# Patient Record
Sex: MALE | Race: WHITE | NOT HISPANIC OR LATINO | Employment: OTHER
[De-identification: names, ages, dates, MRNs, and addresses within clinical notes are randomized per-mention and may not be internally consistent; named-entity substitution may affect disease eponyms.]

---

## 2018-04-27 ENCOUNTER — TRANSCRIBE ORDERS (OUTPATIENT)
Dept: SCHEDULING | Age: 68
End: 2018-04-27

## 2018-04-27 DIAGNOSIS — M81.0 SENILE OSTEOPOROSIS: Primary | ICD-10-CM

## 2018-05-02 ENCOUNTER — HOSPITAL ENCOUNTER (OUTPATIENT)
Dept: RADIOLOGY | Age: 68
Discharge: HOME | End: 2018-05-02
Attending: INTERNAL MEDICINE
Payer: MEDICARE

## 2018-05-02 DIAGNOSIS — M81.0 SENILE OSTEOPOROSIS: ICD-10-CM

## 2018-05-02 PROCEDURE — 77080 DXA BONE DENSITY AXIAL: CPT

## 2020-09-23 PROBLEM — Z98.42 CATARACT EXTRACTION STATUS: Noted: 2020-09-10

## 2020-09-23 PROBLEM — Z98.41 CATARACT EXTRACTION STATUS: Noted: 2020-09-22

## 2020-09-23 PROBLEM — H52.4 PRESBYOPIA: Noted: 2021-02-08

## 2020-09-23 PROBLEM — H35.342 MACULAR HOLE: Noted: 2020-12-28

## 2020-09-23 PROBLEM — Z98.41 S/P CATARACT SURGERY: Noted: 2020-09-23

## 2020-09-23 PROBLEM — H26.493 OTHER SECONDARY CATARACT: Noted: 2020-12-28

## 2020-09-23 PROBLEM — H25.813 COMBINED SENILE CATARACT: Noted: 2020-07-23

## 2020-09-23 PROBLEM — Z98.42 S/P CATARACT SURGERY: Noted: 2020-09-23

## 2021-02-08 ENCOUNTER — PREPPED CHART (OUTPATIENT)
Dept: URBAN - METROPOLITAN AREA CLINIC 100 | Facility: CLINIC | Age: 71
End: 2021-02-08

## 2021-02-08 PROBLEM — H52.4 PRESBYOPIA: Noted: 2021-02-08

## 2021-02-08 PROBLEM — H35.342 MACULAR HOLE: Noted: 2020-12-28

## 2021-03-16 ENCOUNTER — OFFICE VISIT (OUTPATIENT)
Dept: ORTHOPEDICS | Facility: CLINIC | Age: 71
End: 2021-03-16
Payer: MEDICARE

## 2021-03-16 VITALS — BODY MASS INDEX: 20.72 KG/M2 | WEIGHT: 132 LBS | HEIGHT: 67 IN

## 2021-03-16 DIAGNOSIS — M48.061 LUMBAR STENOSIS WITHOUT NEUROGENIC CLAUDICATION: ICD-10-CM

## 2021-03-16 DIAGNOSIS — M47.816 LUMBAR FACET ARTHROPATHY: ICD-10-CM

## 2021-03-16 DIAGNOSIS — M51.369 LUMBAR DEGENERATIVE DISC DISEASE: ICD-10-CM

## 2021-03-16 DIAGNOSIS — M41.56 OTHER SECONDARY SCOLIOSIS, LUMBAR REGION: ICD-10-CM

## 2021-03-16 DIAGNOSIS — M54.16 LUMBAR RADICULOPATHY: Primary | ICD-10-CM

## 2021-03-16 PROCEDURE — 99204 OFFICE O/P NEW MOD 45 MIN: CPT | Performed by: ORTHOPAEDIC SURGERY

## 2021-03-16 RX ORDER — LAMOTRIGINE 100 MG/1
200 TABLET ORAL DAILY
COMMUNITY
Start: 2015-09-11

## 2021-03-16 RX ORDER — TAMSULOSIN HYDROCHLORIDE 0.4 MG/1
0.4 CAPSULE ORAL DAILY
COMMUNITY

## 2021-03-16 RX ORDER — AMOXICILLIN 500 MG/1
500 CAPSULE ORAL
COMMUNITY
End: 2021-04-29 | Stop reason: ENTERED-IN-ERROR

## 2021-03-16 RX ORDER — FINASTERIDE 5 MG/1
5 TABLET, FILM COATED ORAL DAILY
COMMUNITY

## 2021-03-16 RX ORDER — GABAPENTIN 100 MG/1
300 CAPSULE ORAL 2 TIMES DAILY
COMMUNITY
Start: 2021-02-09

## 2021-03-16 NOTE — PROGRESS NOTES
"ORTHOSPINE H&P  Admitting Diagnosis:  No admission diagnoses are documented for this encounter.      CHIEF COMPLAINT : 1.  Right buttock pain  2.  Right thigh pain  3.  Right lateral calf pain  4.  Right leg numbness  5.  Declining walk tolerance    HPI :     Patient is a 71 y.o. male who presents with 3 months history progressive symptoms as noted above.    Physical therapy no relief 2 injections provided no relief.    No surgery to date.    Symptoms reportedly sharp burning shooting aching throbbing sickening stabbing and punishing in nature.    Pain is aggravated by coughing sneezing straining bending forward and backward.    Notes improvement when he sits.    1 block walk tolerance.    Pain is worse with standing sitting and walking all capacities.    All treatments have provided no benefit except medications.  Treatment options have included chiropractor anti-inflammatories injections physical therapy..     Medical History: No past medical history on file.    Surgical History: No past surgical history on file.    Social History:   Social History     Social History Narrative   • Not on file       Family History: No family history on file.    Allergies: Gluten       Medication List          Accurate as of March 16, 2021 10:32 AM. If you have any questions, ask your nurse or doctor.            CONTINUE taking these medications    amoxicillin 500 mg capsule  Commonly known as: AMOXIL  500 mg.  Dose: 500 mg     finasteride 5 mg tablet  Commonly known as: PROSCAR  5 mg.  Dose: 5 mg     gabapentin 100 mg capsule  Commonly known as: NEURONTIN  Take 100 mg by mouth nightly.  Dose: 100 mg     lamoTRIgine 100 mg tablet  Commonly known as: LaMICtal  100 mg.  Dose: 100 mg     tamsulosin 0.4 mg capsule  Commonly known as: FLOMAX  0.4 mg.  Dose: 0.4 mg          Review of Systems  All other systems reviewed and negative except as noted in the HPI.    Objective       Physical Exam :   Visit Vitals  Ht 1.702 m (5' 7\")   Wt 59.9 " kg (132 lb)   BMI 20.67 kg/m²     Alert and oriented to person time and place.  Range of motion restricted.  Mild coronal deformity.  Motor testing lower extremities 5/5 bilaterally.  Provocative radicular testing absent      Imaging : Personal view MRI scan lumbar spine notes a thoracolumbar scoliosis multilevel lumbar disc and facet disease with congenital stenosis producing moderate to severe foraminal stenosis moderate central canal stenosis.  No instability is noted.  No critical stenosis.    MRI report reviewed      IMPRESSION : 1.  Lumbar scoliosis  2.  Multilevel lumbar congenital acquired spinal stenosis with right lower extremity radiculopathy  3.  Congenital acquired lumbar spinal stenosis with neurogenic claudication  4.  Multilevel lumbar facet arthropathy    PLAN : Treatment options were reviewed including the role of decompressive laminectomy surgery likely from L2-L5.    I discussed the indications outcomes risks and benefits.    However, before we embark on definitive surgical recommendations or considerations I did advise further imaging which would include scoliosis x-rays, as well as a CT scan of his lumbar spine.    I am hopeful to advise only a decompressive laminectomy surgery with the indications risks benefits outcomes already reviewed more so than the stabilization surgery at this point in particular noting his unilateral predominant leg pain complaints.        Benjamin Reyes MD  3/16/2021  10:32 AM

## 2021-03-17 ENCOUNTER — HOSPITAL ENCOUNTER (OUTPATIENT)
Dept: RADIOLOGY | Facility: HOSPITAL | Age: 71
Discharge: HOME | End: 2021-03-17
Attending: ORTHOPAEDIC SURGERY
Payer: MEDICARE

## 2021-03-17 DIAGNOSIS — M48.061 LUMBAR STENOSIS WITHOUT NEUROGENIC CLAUDICATION: ICD-10-CM

## 2021-03-17 DIAGNOSIS — M54.16 LUMBAR RADICULOPATHY: ICD-10-CM

## 2021-03-17 DIAGNOSIS — M51.369 LUMBAR DEGENERATIVE DISC DISEASE: ICD-10-CM

## 2021-03-17 DIAGNOSIS — M47.816 LUMBAR FACET ARTHROPATHY: ICD-10-CM

## 2021-03-17 DIAGNOSIS — M41.56 OTHER SECONDARY SCOLIOSIS, LUMBAR REGION: ICD-10-CM

## 2021-03-17 PROCEDURE — G1004 CDSM NDSC: HCPCS

## 2021-03-17 PROCEDURE — 72082 X-RAY EXAM ENTIRE SPI 2/3 VW: CPT

## 2021-03-23 ENCOUNTER — OFFICE VISIT (OUTPATIENT)
Dept: ORTHOPEDICS | Facility: CLINIC | Age: 71
End: 2021-03-23
Payer: MEDICARE

## 2021-03-23 VITALS — BODY MASS INDEX: 20.72 KG/M2 | HEIGHT: 67 IN | WEIGHT: 132 LBS

## 2021-03-23 DIAGNOSIS — M48.061 LUMBAR STENOSIS WITHOUT NEUROGENIC CLAUDICATION: ICD-10-CM

## 2021-03-23 DIAGNOSIS — M41.56 OTHER SECONDARY SCOLIOSIS, LUMBAR REGION: ICD-10-CM

## 2021-03-23 DIAGNOSIS — M54.16 LUMBAR RADICULOPATHY: Primary | ICD-10-CM

## 2021-03-23 DIAGNOSIS — M47.816 LUMBAR FACET ARTHROPATHY: ICD-10-CM

## 2021-03-23 DIAGNOSIS — M51.369 LUMBAR DEGENERATIVE DISC DISEASE: ICD-10-CM

## 2021-03-23 PROCEDURE — 99215 OFFICE O/P EST HI 40 MIN: CPT | Performed by: ORTHOPAEDIC SURGERY

## 2021-03-26 NOTE — PROGRESS NOTES
"ORTHO SPINE ESTABLISHED PATIENT      Otis Mccoy       HPI: Returns noting ongoing lower extremity pain.  Pain is reportedly dermatomal right side predominantly buttock, thigh lateral calf leg numbness and a declining walk tolerance.    At my previous request he did undergo diagnostic studies.    PHYSICAL EXAM :   Visit Vitals  Ht 1.702 m (5' 7\")   Wt 59.9 kg (132 lb)   BMI 20.67 kg/m²       Alert and oriented to person time and place.  Sagittal and coronal deformity is noted with a kyphoscoliotic deformity.  Motor testing lower extremities 5/5 bilaterally.  Provocative radicular testing absent.      Imaging : Personal view scoliosis x-rays outline a kyphoscoliosis without rotatory subluxation in the lower lumbar level.  Lateral projection does note global loss of lumbar lordosis but acceptable alignment.  Thoracic kyphosis and thoracolumbar hyperlordosis are noted.    Personal view MRI scan lumbar spine outlines lumbar spinal stenosis L2-3 L3-4 L4-L5 related to posterior element hypertrophic changes.    MRI x-ray report reviewed    IMPRESSION : 1.  Thoracolumbar kyphoscoliosis  2.  Lumbar spinal stenosis multilevel with right lower extremity radiculopathy  3.  Multilevel lumbar facet arthropathy  4.  Diffuse osteoporosis/osteopenia    PLAN : The risk of posterior lumbar spine surgery was reviewed, including the topics of neurologic, infectious, medical, as well as the clinical outcomes both favorable and unfavorable as a result of a posteriorly-based lumbar spine surgery.    Specifically, the neurologic risks including nerve injury, dural tear, spinal fluid leakage, pseudomeningocele, arachnoiditis, cauda equina syndrome, hematoma with neurologic consequences, traction neuritis, reflex sympathetic dystrophy, additional lower extremity neurologic causalgias, as well as persistent neurologic symptomatology and/or new symptomatology were all reviewed.  In addition, the unpredictable neurologic imponderables were " also referenced.    Infectious complications including superficial and deep wound infection, spinal meningitis, osteomyelitis, discitis, epidural abscess, and the need definitively for further surgery and/or long-term intravenous antibiotic treatment or wound management or revision were outlined.    Medical complications including cardiopulmonary, cardiovascular, cerebrovascular, gastrointestinal, as well as thromboembolic phenomenon were reviewed.  The possibility of a deep vein thrombosis leading to a pulmonary embolism was outlined as well as the cardiac risks, cerebrovascular risks, pulmonary risks, and other medical unpredictable outcomes related to the use of general anesthesia, stress of surgery, and underlying either diagnosed or undiagnosed medical comorbidities.    The concept of adjacent segment degeneration if applicable as well as the concept of ongoing spinal degeneration requiring further surgery even at the same operative level were outlined.  In general, the possibility of outcomes included either short or long-term failures related to prior or current surgical treatments.    Specifically, the outcomes of surgery were reviewed, including the favorable, unfavorable, and unpredictable outcomes related to neurologic surgical procedures as well as specifically posteriorly-based lumbar spine surgery.  These outcomes included relief of leg pain, relief of low back pain, as well as the possibility of no change or worsening in this patient's preoperative complaints.  In addition, the possibility of a patient developing a clinical syndrome that was not present preoperatively and manifested itself in the postoperative period was reviewed.  The percentage probability of improving presurgical leg pain, low back pain, as well as the additional neuritic complaints of numbness, weakness, and paresthesias were reviewed.    Throughout the course of this presurgical discussion it was emphasized over multiple  conversations that this procedure generally improves predictably radicular or leg pain complaints and generally has an unpredictable outcome result regarding axial pain complaints.    I discussed the clinical treatment options in great detail both surgical and nonsurgical.  We did discuss the role of spinal surgery, particularly revision surgery and the risks associated with this procedure specifically.  Risk of surgery including nerve root injury, dural tear, spinal fluid leakage, pseudomeningocele, and post laminectomy instability were reviewed.  Instrumentation complications including implant failure, fracture, migration, loosening, dislodgment, need for revision, and removal of implants were all reviewed.    The patient was scheduled to have appropriate preadmission testing performed, which would include an H NP, blood work, EKG, and a cardiac evaluation prior to surgery.  In addition, the need for vascular surgeon evaluation depending on prior history of blood clots or pulmonary embolism were reviewed.    This patient is going to require an extended postoperative length of stay greater than 2 midnight stays.  In fact, this patient will require up to a 4 day hospital stay as a result of the need for closed suction drain, need for 48 hours of intravenous antibiotics recommended by the infectious disease specialist as well as the concerns over a possible perioperative neurologic deficit by way of either a hematoma and/or a compressive wound condition.  In addition, this patient is manifesting a number of medical comorbidities including the need for medication monitoring and pain management monitoring as a result of the extensive nature of their spinal surgical procedure.  It is my recommendation that this patient will likely require a greater than 24 hour hospital stay due to the risks to this patient of a perioperative or postoperative complication that would ultimately result in grave circumstances in the  outpatient setting and increase this patient to a higher risk of postoperative morbidity and/or mortality.    The above outlines a educational documentation for the patient via [employer].    I personally discussed the role of a decompressive laminectomy focusing on the posterior L2-L5 level.    Outcomes of surgery were reviewed and risks particularly infection due to immunocompromise state and pathologic instability post laminectomy as a result of osteoporosis which is fairly evident on the plain radiographs.      Benjamin Reyes MD   3/26/2021  7:50 AM

## 2021-04-29 ENCOUNTER — APPOINTMENT (OUTPATIENT)
Dept: LAB | Facility: HOSPITAL | Age: 71
End: 2021-04-29
Attending: ORTHOPAEDIC SURGERY
Payer: MEDICARE

## 2021-04-29 ENCOUNTER — TRANSCRIBE ORDERS (OUTPATIENT)
Dept: ORTHOPEDICS | Facility: CLINIC | Age: 71
End: 2021-04-29

## 2021-04-29 ENCOUNTER — OFFICE VISIT (OUTPATIENT)
Dept: PREADMISSION TESTING | Facility: HOSPITAL | Age: 71
End: 2021-04-29
Attending: ORTHOPAEDIC SURGERY
Payer: MEDICARE

## 2021-04-29 VITALS
HEART RATE: 61 BPM | SYSTOLIC BLOOD PRESSURE: 136 MMHG | HEIGHT: 67 IN | TEMPERATURE: 98.1 F | DIASTOLIC BLOOD PRESSURE: 72 MMHG | WEIGHT: 132.6 LBS | RESPIRATION RATE: 18 BRPM | BODY MASS INDEX: 20.81 KG/M2 | OXYGEN SATURATION: 96 %

## 2021-04-29 DIAGNOSIS — M54.17 RADICULOPATHY, LUMBOSACRAL REGION: ICD-10-CM

## 2021-04-29 DIAGNOSIS — N40.0 BENIGN PROSTATIC HYPERPLASIA, UNSPECIFIED WHETHER LOWER URINARY TRACT SYMPTOMS PRESENT: ICD-10-CM

## 2021-04-29 DIAGNOSIS — M48.061 LUMBAR STENOSIS WITHOUT NEUROGENIC CLAUDICATION: ICD-10-CM

## 2021-04-29 DIAGNOSIS — Z01.818 PREOP EXAMINATION: Primary | ICD-10-CM

## 2021-04-29 DIAGNOSIS — M48.062 SPINAL STENOSIS, LUMBAR REGION WITH NEUROGENIC CLAUDICATION: Primary | ICD-10-CM

## 2021-04-29 DIAGNOSIS — M48.062 SPINAL STENOSIS, LUMBAR REGION WITH NEUROGENIC CLAUDICATION: ICD-10-CM

## 2021-04-29 DIAGNOSIS — F31.9 BIPOLAR AFFECTIVE DISORDER, REMISSION STATUS UNSPECIFIED (CMS/HCC): ICD-10-CM

## 2021-04-29 DIAGNOSIS — K90.0 CELIAC DISEASE: ICD-10-CM

## 2021-04-29 DIAGNOSIS — F17.200 SMOKER: ICD-10-CM

## 2021-04-29 PROBLEM — Z86.018 HISTORY OF ATRIAL MYXOMA: Status: ACTIVE | Noted: 2021-04-29

## 2021-04-29 PROBLEM — K08.9 POOR DENTITION: Status: ACTIVE | Noted: 2021-04-29

## 2021-04-29 LAB
ABO + RH BLD: NORMAL
ANION GAP SERPL CALC-SCNC: 8 MEQ/L (ref 3–15)
APTT PPP: 29 SEC (ref 23–35)
BASOPHILS # BLD: 0.03 K/UL (ref 0.01–0.1)
BASOPHILS NFR BLD: 0.4 %
BLD GP AB SCN SERPL QL: NEGATIVE
BLOOD BANK CMNT PATIENT-IMP: NORMAL
BUN SERPL-MCNC: 19 MG/DL (ref 8–20)
CALCIUM SERPL-MCNC: 8.8 MG/DL (ref 8.9–10.3)
CHLORIDE SERPL-SCNC: 107 MEQ/L (ref 98–109)
CO2 SERPL-SCNC: 24 MEQ/L (ref 22–32)
CREAT SERPL-MCNC: 1 MG/DL (ref 0.8–1.3)
D AG BLD QL: POSITIVE
DIFFERENTIAL METHOD BLD: ABNORMAL
EOSINOPHIL # BLD: 0.12 K/UL (ref 0.04–0.54)
EOSINOPHIL NFR BLD: 1.4 %
ERYTHROCYTE [DISTWIDTH] IN BLOOD BY AUTOMATED COUNT: 15.4 % (ref 11.6–14.4)
EST. AVERAGE GLUCOSE BLD GHB EST-MCNC: 114 MG/DL
GFR SERPL CREATININE-BSD FRML MDRD: >60 ML/MIN/1.73M*2
GLUCOSE SERPL-MCNC: 82 MG/DL (ref 70–99)
HBA1C MFR BLD HPLC: 5.6 %
HCT VFR BLDCO AUTO: 40.4 % (ref 40.1–51)
HGB BLD-MCNC: 12.6 G/DL (ref 13.7–17.5)
IMM GRANULOCYTES # BLD AUTO: 0.02 K/UL (ref 0–0.08)
IMM GRANULOCYTES NFR BLD AUTO: 0.2 %
INR PPP: 1
LABORATORY COMMENT REPORT: NORMAL
LYMPHOCYTES # BLD: 1.49 K/UL (ref 1.2–3.5)
LYMPHOCYTES NFR BLD: 17.8 %
MCH RBC QN AUTO: 27.5 PG (ref 28–33.2)
MCHC RBC AUTO-ENTMCNC: 31.2 G/DL (ref 32.2–36.5)
MCV RBC AUTO: 88.2 FL (ref 83–98)
MONOCYTES # BLD: 0.52 K/UL (ref 0.3–1)
MONOCYTES NFR BLD: 6.2 %
NEUTROPHILS # BLD: 6.19 K/UL (ref 1.7–7)
NEUTS SEG NFR BLD: 74 %
NRBC BLD-RTO: 0 %
PDW BLD AUTO: 11.8 FL (ref 9.4–12.4)
PLATELET # BLD AUTO: 242 K/UL (ref 150–350)
POTASSIUM SERPL-SCNC: 4.4 MEQ/L (ref 3.6–5.1)
PROTHROMBIN TIME: 12.8 SEC (ref 12.2–14.5)
RBC # BLD AUTO: 4.58 M/UL (ref 4.5–5.8)
SODIUM SERPL-SCNC: 139 MEQ/L (ref 136–144)
WBC # BLD AUTO: 8.37 K/UL (ref 3.8–10.5)

## 2021-04-29 PROCEDURE — 86900 BLOOD TYPING SEROLOGIC ABO: CPT

## 2021-04-29 PROCEDURE — 93005 ELECTROCARDIOGRAM TRACING: CPT | Performed by: INTERNAL MEDICINE

## 2021-04-29 PROCEDURE — 99204 OFFICE O/P NEW MOD 45 MIN: CPT | Performed by: INTERNAL MEDICINE

## 2021-04-29 PROCEDURE — 83036 HEMOGLOBIN GLYCOSYLATED A1C: CPT | Mod: GA

## 2021-04-29 PROCEDURE — 36415 COLL VENOUS BLD VENIPUNCTURE: CPT

## 2021-04-29 PROCEDURE — 85610 PROTHROMBIN TIME: CPT

## 2021-04-29 PROCEDURE — 80048 BASIC METABOLIC PNL TOTAL CA: CPT

## 2021-04-29 PROCEDURE — 85730 THROMBOPLASTIN TIME PARTIAL: CPT | Mod: GA

## 2021-04-29 PROCEDURE — 85025 COMPLETE CBC W/AUTO DIFF WBC: CPT

## 2021-04-29 RX ORDER — IBUPROFEN 200 MG
800 TABLET ORAL EVERY 6 HOURS PRN
COMMUNITY
End: 2021-05-10 | Stop reason: SDUPTHER

## 2021-04-29 RX ORDER — DULOXETIN HYDROCHLORIDE 60 MG/1
60 CAPSULE, DELAYED RELEASE ORAL NIGHTLY
COMMUNITY

## 2021-04-29 ASSESSMENT — PAIN SCALES - GENERAL: PAINLEVEL: 6

## 2021-04-29 NOTE — H&P (VIEW-ONLY)
[facility] Medicine Service -  Pre-Operative Consultation       Patient Name: Otis Mccoy  Referring Surgeon: Benjamin Reyes    Reason for Referral: Pre-Operative Evaluation  Surgical Procedure: lumbar laminectomy L2-S1  Operative Date: 5/10/21  Other Providers:      PCP: Leticia Cloud DO     Cardiology: Marshall Galdamez  Psychiatry: thru [place]     HISTORY OF PRESENT ILLNESS      Otis Mccoy is a 71 y.o. male presenting today to the [facility] at [facility] for pre-operative evaluation prior to planned surgery.    This patient has had at least 3-4 months of progressive symptoms related to his lumbar spine. He has severe deep right leg pain, to his toes, worse with ambulation, along with right lower back pain. His leg bothers him moreso than his back. He has numbness to his right foot. His right calf hurts severely at times particularly when walking. He has no left-sided symptoms. He has had no relief with prior attempts at management.     In regards to medical history:  · He has BPH, medically managed.  · He has bipolar disorder, medically managed.  · He has a history of Juanita-Genao tear, received blood transfusions years ago for this.  · He has celiac disease.  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 Moreno but he reports he hasn't seen cardiology in some time.   · He is a smoker.  · He reports he received both COVID vaccines (second shot in early April per patient).     The patient denies any current or recent chest pain or pressure, dyspnea, cough, sputum, fevers, chills, abdominal pain, nausea, vomiting, diarrhea or other symptoms.     Functionally, the patient is able to ascend a flight or so of stairs with no dyspnea or chest pain.     The patient denies, on specific questioning, the following:  No history of MI, arrhythmia,or CHF.  No history of ESTRELLA. STOP-Bang  "Total Score: 2  No history of DVT/PE.  No history of COPD.  No history of CVA.  No history of DM.   No history of CKD.     PAST MEDICAL AND SURGICAL HISTORY      Past Medical History:   Diagnosis Date   • Anxiety     hx of   • Arthritis     bottom of spine   • Celiac disease     hx of   • Depression    • ED (erectile dysfunction)     hx of   • Fatigue     hx of   • Numbness     hx of       Past Surgical History:   Procedure Laterality Date   • CARDIAC SURGERY     • CATARACT EXTRACTION, BILATERAL      hx of   • KNEE ARTHROPLASTY      left knee       MEDICATIONS        Current Outpatient Medications:   •  DULoxetine (CYMBALTA) 60 mg capsule, Take 60 mg by mouth nightly., Disp: , Rfl:   •  ibuprofen (MOTRIN) 200 mg tablet, Take 800 mg by mouth every 6 (six) hours as needed for mild pain., Disp: , Rfl:   •  finasteride (PROSCAR) 5 mg tablet, Take 5 mg by mouth daily.  , Disp: , Rfl:   •  gabapentin (NEURONTIN) 100 mg capsule, Take 300 mg by mouth 2 (two) times a day.  , Disp: , Rfl:   •  lamoTRIgine (LaMICtal) 100 mg tablet, Take 200 mg by mouth daily.  , Disp: , Rfl:   •  tamsulosin (FLOMAX) 0.4 mg capsule, Take 0.4 mg by mouth daily.  , Disp: , Rfl:     ALLERGIES      Gluten    FAMILY HISTORY      family history includes Brain Aneurysm in his biological mother; No Known Problems in his biological father and biological sister.    Denies any prior known family history of DVTs/PEs/clotting disorder    SOCIAL HISTORY      Social History     Tobacco Use   • Smoking status: Current Every Day Smoker     Packs/day: 1.00     Years: 50.00     Pack years: 50.00     Types: Cigarettes   • Smokeless tobacco: Never Used   Vaping Use   • Vaping Use: Never used   Substance Use Topics   • Alcohol use: Yes     Comment: 6driinks a week   • Drug use: Never     has been \"semi-retired\" since the pandemic; he has worked as a     REVIEW OF SYSTEMS      All other systems reviewed and negative except as noted in " "HPI    PHYSICAL EXAMINATION      Visit Vitals  /72 (BP Location: Right upper arm, Patient Position: Sitting)   Pulse 61   Temp 36.7 °C (98.1 °F) (Temporal)   Resp 18   Ht 1.702 m (5' 7\")   Wt 60.1 kg (132 lb 9.6 oz)   SpO2 96%   BMI 20.77 kg/m²     Body mass index is 20.77 kg/m².  GEN: well-developed and well-nourished; not in acute distress  HEENT: normocephalic; atraumatic  NECK: no JVD; no bruits  CARDIO: regular rate and rhythm; no murmurs or rubs  RESP: clear to auscultation bilaterally; no rales, rhonchi, or wheezes  ABD: soft, non-distended, non-tender, normal bowel sounds  EXT: no cyanosis, clubbing, or edema  SKIN: clean, dry, warm, and intact  MUSCULOSKELETAL: no injury or deformity  NEURO: alert and oriented x 3; 5-/5 right hip flexor otherwise 5/5 throughout  BEHAVIOR/EMOTIONAL: appropriate; cooperative    LABS / EKG        Labs  Lab Results   Component Value Date     04/29/2021    K 4.4 04/29/2021     04/29/2021    BUN 19 04/29/2021    CREATININE 1.0 04/29/2021    WBC 8.37 04/29/2021    HGB 12.6 (L) 04/29/2021    HCT 40.4 04/29/2021     04/29/2021    ALT 28 08/04/2015    AST 25 08/04/2015    INR 1.0 04/29/2021    HGBA1C 5.6 04/29/2021       ECG/Telemetry  NSR; RBBB; LAFB    ASSESSMENT AND PLAN         Preop examination  Medical management and jose-operative risk commentary noted as per this document's contents.    Lumbar stenosis without neurogenic claudication  Surgery as scheduled.    History of atrial myxoma  s/p resection in 2015 (Dr. Luna). Noted for the record. He cannot recall details of how this was picked up. He is seeing Dr. Galdamez on 5/6/21.     BPH (benign prostatic hyperplasia)  Continue finasteride and tamsulosin. Due to his pre-existing condition of BPH, the patient is theoretically at possible increased risk of urinary retention post-operatively.    Bipolar disorder (CMS/HCC)  Continue current medical regimen.    Celiac disease  Ensure gluten free diet. " Outpatient follow-up.    Smoker  Smoking cessation counseling performed.  Offer nicotine patch while in-house.    Poor dentition  Noted for the record. He should be following with dentistry. Denies tooth pain today.        In regards to perioperative cardiac risk:  Needs to see Dr. Galdamez.    Further comments:  Resume supplements when OK with surgical team.  I would encourage incentive spirometry to assist with minimizing jose-operative pulmonary risk.  DVT prophylaxis and timing of such per the discretion of the surgeon.     Please do not hesitate to contact [facility] during the upcoming hospitalization with any questions or concerns.     Odilon Patel MD  4/30/2021

## 2021-04-29 NOTE — CONSULTS
[facility] Medicine Service -  Pre-Operative Consultation       Patient Name: Otis Mccoy  Referring Surgeon: Benjamin Reyes    Reason for Referral: Pre-Operative Evaluation  Surgical Procedure: lumbar laminectomy L2-S1  Operative Date: 5/10/21  Other Providers:      PCP: Leticia Cloud DO     Cardiology: Marshall Galdamez  Psychiatry: thru [place]     HISTORY OF PRESENT ILLNESS      Otis Mccoy is a 71 y.o. male presenting today to the [facility] at [facility] for pre-operative evaluation prior to planned surgery.    This patient has had at least 3-4 months of progressive symptoms related to his lumbar spine. He has severe deep right leg pain, to his toes, worse with ambulation, along with right lower back pain. His leg bothers him moreso than his back. He has numbness to his right foot. His right calf hurts severely at times particularly when walking. He has no left-sided symptoms. He has had no relief with prior attempts at management.     In regards to medical history:  · He has BPH, medically managed.  · He has bipolar disorder, medically managed.  · He has a history of Juanita-Genao tear, received blood transfusions years ago for this.  · He has celiac disease.  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 Moreno but he reports he hasn't seen cardiology in some time.   · He is a smoker.  · He reports he received both COVID vaccines (second shot in early April per patient).     The patient denies any current or recent chest pain or pressure, dyspnea, cough, sputum, fevers, chills, abdominal pain, nausea, vomiting, diarrhea or other symptoms.     Functionally, the patient is able to ascend a flight or so of stairs with no dyspnea or chest pain.     The patient denies, on specific questioning, the following:  No history of MI, arrhythmia,or CHF.  No history of ESTRELLA. STOP-Bang  "Total Score: 2  No history of DVT/PE.  No history of COPD.  No history of CVA.  No history of DM.   No history of CKD.     PAST MEDICAL AND SURGICAL HISTORY      Past Medical History:   Diagnosis Date   • Anxiety     hx of   • Arthritis     bottom of spine   • Celiac disease     hx of   • Depression    • ED (erectile dysfunction)     hx of   • Fatigue     hx of   • Numbness     hx of       Past Surgical History:   Procedure Laterality Date   • CARDIAC SURGERY     • CATARACT EXTRACTION, BILATERAL      hx of   • KNEE ARTHROPLASTY      left knee       MEDICATIONS        Current Outpatient Medications:   •  DULoxetine (CYMBALTA) 60 mg capsule, Take 60 mg by mouth nightly., Disp: , Rfl:   •  ibuprofen (MOTRIN) 200 mg tablet, Take 800 mg by mouth every 6 (six) hours as needed for mild pain., Disp: , Rfl:   •  finasteride (PROSCAR) 5 mg tablet, Take 5 mg by mouth daily.  , Disp: , Rfl:   •  gabapentin (NEURONTIN) 100 mg capsule, Take 300 mg by mouth 2 (two) times a day.  , Disp: , Rfl:   •  lamoTRIgine (LaMICtal) 100 mg tablet, Take 200 mg by mouth daily.  , Disp: , Rfl:   •  tamsulosin (FLOMAX) 0.4 mg capsule, Take 0.4 mg by mouth daily.  , Disp: , Rfl:     ALLERGIES      Gluten    FAMILY HISTORY      family history includes Brain Aneurysm in his biological mother; No Known Problems in his biological father and biological sister.    Denies any prior known family history of DVTs/PEs/clotting disorder    SOCIAL HISTORY      Social History     Tobacco Use   • Smoking status: Current Every Day Smoker     Packs/day: 1.00     Years: 50.00     Pack years: 50.00     Types: Cigarettes   • Smokeless tobacco: Never Used   Vaping Use   • Vaping Use: Never used   Substance Use Topics   • Alcohol use: Yes     Comment: 6driinks a week   • Drug use: Never     has been \"semi-retired\" since the pandemic; he has worked as a     REVIEW OF SYSTEMS      All other systems reviewed and negative except as noted in " "HPI    PHYSICAL EXAMINATION      Visit Vitals  /72 (BP Location: Right upper arm, Patient Position: Sitting)   Pulse 61   Temp 36.7 °C (98.1 °F) (Temporal)   Resp 18   Ht 1.702 m (5' 7\")   Wt 60.1 kg (132 lb 9.6 oz)   SpO2 96%   BMI 20.77 kg/m²     Body mass index is 20.77 kg/m².  GEN: well-developed and well-nourished; not in acute distress  HEENT: normocephalic; atraumatic  NECK: no JVD; no bruits  CARDIO: regular rate and rhythm; no murmurs or rubs  RESP: clear to auscultation bilaterally; no rales, rhonchi, or wheezes  ABD: soft, non-distended, non-tender, normal bowel sounds  EXT: no cyanosis, clubbing, or edema  SKIN: clean, dry, warm, and intact  MUSCULOSKELETAL: no injury or deformity  NEURO: alert and oriented x 3; 5-/5 right hip flexor otherwise 5/5 throughout  BEHAVIOR/EMOTIONAL: appropriate; cooperative    LABS / EKG        Labs  Lab Results   Component Value Date     04/29/2021    K 4.4 04/29/2021     04/29/2021    BUN 19 04/29/2021    CREATININE 1.0 04/29/2021    WBC 8.37 04/29/2021    HGB 12.6 (L) 04/29/2021    HCT 40.4 04/29/2021     04/29/2021    ALT 28 08/04/2015    AST 25 08/04/2015    INR 1.0 04/29/2021    HGBA1C 5.6 04/29/2021       ECG/Telemetry  NSR; RBBB; LAFB    ASSESSMENT AND PLAN         Preop examination  Medical management and jose-operative risk commentary noted as per this document's contents.    Lumbar stenosis without neurogenic claudication  Surgery as scheduled.    History of atrial myxoma  s/p resection in 2015 (Dr. Luna). Noted for the record. He cannot recall details of how this was picked up. He is seeing Dr. Galdamez on 5/6/21.     BPH (benign prostatic hyperplasia)  Continue finasteride and tamsulosin. Due to his pre-existing condition of BPH, the patient is theoretically at possible increased risk of urinary retention post-operatively.    Bipolar disorder (CMS/HCC)  Continue current medical regimen.    Celiac disease  Ensure gluten free diet. " Outpatient follow-up.    Smoker  Smoking cessation counseling performed.  Offer nicotine patch while in-house.    Poor dentition  Noted for the record. He should be following with dentistry. Denies tooth pain today.        In regards to perioperative cardiac risk:  Needs to see Dr. Galdamez.    Further comments:  Resume supplements when OK with surgical team.  I would encourage incentive spirometry to assist with minimizing jose-operative pulmonary risk.  DVT prophylaxis and timing of such per the discretion of the surgeon.     Please do not hesitate to contact [facility] during the upcoming hospitalization with any questions or concerns.     Odilon Patel MD  4/30/2021

## 2021-04-29 NOTE — ASSESSMENT & PLAN NOTE
Noted for the record. He should be following with dentistry. Denies tooth pain today.    Eggs and rice

## 2021-04-29 NOTE — ASSESSMENT & PLAN NOTE
s/p resection in 2015 (Dr. Luna). Noted for the record. He cannot recall details of how this was picked up. He is seeing Dr. Galdamez on 5/6/21.

## 2021-04-29 NOTE — PRE-PROCEDURE INSTRUCTIONS
1. We will call you between 3 pm and 7 pm on May 7, 2021 to determine that arrival time for your procedure. If you do not hear by 6PM. Please call [phone] for arrival time.    2. Please report to Main Entrance near Parking lot A, walk into main lobby and report to the admission desk on the first floor on the day of your procedure.       3. Please follow the following fasting guidelines: Follow Dr. Reyes's instructions        Nothing to eat (no solid food) after midnight.    Unlimited clear liquids, meaning water or PLAIN black coffee WITHOUT any milk or cream, are permitted up to TWO HOURS prior to arrival at the hospital.     4. Early on the morning of the procedure please take your usual dose of the listed medications with a sip of water: LaMictal   No Aspirin, Advil, Aleve,Motrin,Supplements, Vitamins  For 1 week prior to surgery.    May take Tylenol if needed.   5. Other Instructions: You may brush your teeth the morning of the procedure. Rinse and spit, do not swallow.  Bring a list of your medications with dosages with you.  Use surgical wash as directed. CHG Scrub 2 night before and the night before the procedure and the morning of the procedure.   6. If you develop a cold, cough, fever, rash, or other symptom prior to the data of the procedure, please report it to your physician immediately.   7. If you need to cancel the procedure for any reason, please contact your physician or call the unit listed above.   8. Make arrangements to have someone drive you home from the procedure. If you have not arranged for transportation home, your surgery may be cancelled.    9. You may not take public transportation unless accompanied by a responsible person.   10. You may not drive a car or operate complex or potentially dangerous machinery for 24 hours following anesthesia and/or sedation.   11. If it is medically necessary for you to have a longer stay, you will be informed as soon as the decision is made.    12. Do not wear or bring anything of value to the hospital including jewelry of any kind, money, or wallet. Do not wear make-up or contact lenses. DO bring your glasses and a case. DO NOT BRING MEDICATIONS FROM HOME.   13. No lotion, creams, powders, or oils on skin the morning of procedure    14. Dress in comfortable clothes.   15.  If instructed, please bring a copy of your Advanced Directive (Living Will/Durable Power of ) on the day of your procedure.      Pre operative instructions given as per protocol.  Form explained by: Ivana Payne RN     I have read and understand the above information. I have had sufficient opportunity to ask questions I might have and they have been answered to my satisfaction. I agree to comply with the Patient Responsibilities listed above and have received a copy of this form.

## 2021-04-29 NOTE — ASSESSMENT & PLAN NOTE
Continue finasteride and tamsulosin. Due to his pre-existing condition of BPH, the patient is theoretically at possible increased risk of urinary retention post-operatively.

## 2021-04-30 LAB
ATRIAL RATE: 60
P AXIS: 85
PR INTERVAL: 192
QRS DURATION: 154
QT INTERVAL: 474
QTC CALCULATION(BAZETT): 474
R AXIS: -69
T WAVE AXIS: 65
VENTRICULAR RATE: 60

## 2021-04-30 PROCEDURE — 93010 ELECTROCARDIOGRAM REPORT: CPT | Performed by: INTERNAL MEDICINE

## 2021-04-30 NOTE — UM PHYSICIAN REVIEW NOTE
Inpatient appropriate for this 70 yo male with planned L2-L5 laminectomy with anticipated >2 midnight hospital stay. If hospital stay is <2 midnights, please resubmit post discharge for review.

## 2021-05-05 ENCOUNTER — TRANSCRIBE ORDERS (OUTPATIENT)
Dept: ORTHOPEDICS | Facility: CLINIC | Age: 71
End: 2021-05-05

## 2021-05-05 ENCOUNTER — APPOINTMENT (OUTPATIENT)
Dept: LAB | Facility: HOSPITAL | Age: 71
End: 2021-05-05
Attending: ORTHOPAEDIC SURGERY
Payer: MEDICARE

## 2021-05-05 DIAGNOSIS — Z11.59 ENCOUNTER FOR SCREENING FOR OTHER VIRAL DISEASES: Primary | ICD-10-CM

## 2021-05-05 DIAGNOSIS — Z11.59 ENCOUNTER FOR SCREENING FOR OTHER VIRAL DISEASES: ICD-10-CM

## 2021-05-05 LAB — SARS-COV-2 RNA RESP QL NAA+PROBE: NEGATIVE

## 2021-05-05 PROCEDURE — U0003 INFECTIOUS AGENT DETECTION BY NUCLEIC ACID (DNA OR RNA); SEVERE ACUTE RESPIRATORY SYNDROME CORONAVIRUS 2 (SARS-COV-2) (CORONAVIRUS DISEASE [COVID-19]), AMPLIFIED PROBE TECHNIQUE, MAKING USE OF HIGH THROUGHPUT TECHNOLOGIES AS DESCRIBED BY CMS-2020-01-R: HCPCS

## 2021-05-09 ENCOUNTER — ANESTHESIA EVENT (OUTPATIENT)
Dept: OPERATING ROOM | Facility: HOSPITAL | Age: 71
Setting detail: SURGERY ADMIT
DRG: 516 | End: 2021-05-09
Payer: MEDICARE

## 2021-05-10 ENCOUNTER — ANESTHESIA (OUTPATIENT)
Dept: OPERATING ROOM | Facility: HOSPITAL | Age: 71
Setting detail: SURGERY ADMIT
DRG: 516 | End: 2021-05-10
Payer: MEDICARE

## 2021-05-10 ENCOUNTER — APPOINTMENT (OUTPATIENT)
Dept: RADIOLOGY | Facility: HOSPITAL | Age: 71
Setting detail: SURGERY ADMIT
DRG: 516 | End: 2021-05-10
Attending: ORTHOPAEDIC SURGERY
Payer: MEDICARE

## 2021-05-10 ENCOUNTER — HOSPITAL ENCOUNTER (INPATIENT)
Facility: HOSPITAL | Age: 71
LOS: 2 days | Discharge: HOME | DRG: 516 | End: 2021-05-12
Attending: ORTHOPAEDIC SURGERY | Admitting: ORTHOPAEDIC SURGERY
Payer: MEDICARE

## 2021-05-10 DIAGNOSIS — M51.369 LUMBAR DEGENERATIVE DISC DISEASE: Primary | ICD-10-CM

## 2021-05-10 PROBLEM — M48.062 LUMBAR STENOSIS WITH NEUROGENIC CLAUDICATION: Status: ACTIVE | Noted: 2021-05-10

## 2021-05-10 PROCEDURE — 12000000 HC ROOM AND CARE MED/SURG

## 2021-05-10 PROCEDURE — 01NB0ZZ RELEASE LUMBAR NERVE, OPEN APPROACH: ICD-10-PCS | Performed by: ORTHOPAEDIC SURGERY

## 2021-05-10 PROCEDURE — 25800000 HC PHARMACY IV SOLUTIONS: Performed by: ORTHOPAEDIC SURGERY

## 2021-05-10 PROCEDURE — 71000011 HC PACU PHASE 1 EA ADDL MIN: Performed by: ORTHOPAEDIC SURGERY

## 2021-05-10 PROCEDURE — 25800000 HC PHARMACY IV SOLUTIONS: Performed by: NURSE ANESTHETIST, CERTIFIED REGISTERED

## 2021-05-10 PROCEDURE — 25000000 HC PHARMACY GENERAL: Performed by: ORTHOPAEDIC SURGERY

## 2021-05-10 PROCEDURE — 200200 PR NO CHARGE: Performed by: ORTHOPAEDIC SURGERY

## 2021-05-10 PROCEDURE — 72020 X-RAY EXAM OF SPINE 1 VIEW: CPT

## 2021-05-10 PROCEDURE — 25000000 HC PHARMACY GENERAL: Performed by: STUDENT IN AN ORGANIZED HEALTH CARE EDUCATION/TRAINING PROGRAM

## 2021-05-10 PROCEDURE — 63700000 HC SELF-ADMINISTRABLE DRUG: Performed by: ORTHOPAEDIC SURGERY

## 2021-05-10 PROCEDURE — 01NR0ZZ RELEASE SACRAL NERVE, OPEN APPROACH: ICD-10-PCS | Performed by: ORTHOPAEDIC SURGERY

## 2021-05-10 PROCEDURE — 63047 LAM FACETEC & FORAMOT LUMBAR: CPT | Performed by: ORTHOPAEDIC SURGERY

## 2021-05-10 PROCEDURE — 63700000 HC SELF-ADMINISTRABLE DRUG: Performed by: PHYSICIAN ASSISTANT

## 2021-05-10 PROCEDURE — 27200000 HC STERILE SUPPLY: Performed by: ORTHOPAEDIC SURGERY

## 2021-05-10 PROCEDURE — 63600000 HC DRUGS/DETAIL CODE: Performed by: STUDENT IN AN ORGANIZED HEALTH CARE EDUCATION/TRAINING PROGRAM

## 2021-05-10 PROCEDURE — 25000000 HC PHARMACY GENERAL: Performed by: NURSE ANESTHETIST, CERTIFIED REGISTERED

## 2021-05-10 PROCEDURE — 97116 GAIT TRAINING THERAPY: CPT | Mod: GP

## 2021-05-10 PROCEDURE — 63600000 HC DRUGS/DETAIL CODE: Performed by: ORTHOPAEDIC SURGERY

## 2021-05-10 PROCEDURE — 63700000 HC SELF-ADMINISTRABLE DRUG: Performed by: STUDENT IN AN ORGANIZED HEALTH CARE EDUCATION/TRAINING PROGRAM

## 2021-05-10 PROCEDURE — 36000014 HC OR LEVEL 4 EA ADDL MIN: Performed by: ORTHOPAEDIC SURGERY

## 2021-05-10 PROCEDURE — 71000001 HC PACU PHASE 1 INITIAL 30MIN: Performed by: ORTHOPAEDIC SURGERY

## 2021-05-10 PROCEDURE — 97162 PT EVAL MOD COMPLEX 30 MIN: CPT | Mod: GP

## 2021-05-10 PROCEDURE — 36000004 HC OR LEVEL 4 INITIAL 30MIN: Performed by: ORTHOPAEDIC SURGERY

## 2021-05-10 PROCEDURE — 37000001 HC ANESTHESIA GENERAL: Performed by: ORTHOPAEDIC SURGERY

## 2021-05-10 PROCEDURE — 63600000 HC DRUGS/DETAIL CODE: Performed by: PHYSICIAN ASSISTANT

## 2021-05-10 PROCEDURE — 63048 LAM FACETEC &FORAMOT EA ADDL: CPT | Performed by: ORTHOPAEDIC SURGERY

## 2021-05-10 RX ORDER — IBUPROFEN 200 MG
16-32 TABLET ORAL AS NEEDED
Status: DISCONTINUED | OUTPATIENT
Start: 2021-05-10 | End: 2021-05-10 | Stop reason: SDUPTHER

## 2021-05-10 RX ORDER — DEXTROSE 40 %
15-30 GEL (GRAM) ORAL AS NEEDED
Status: DISCONTINUED | OUTPATIENT
Start: 2021-05-10 | End: 2021-05-12 | Stop reason: HOSPADM

## 2021-05-10 RX ORDER — TAMSULOSIN HYDROCHLORIDE 0.4 MG/1
0.4 CAPSULE ORAL DAILY
Status: DISCONTINUED | OUTPATIENT
Start: 2021-05-10 | End: 2021-05-11

## 2021-05-10 RX ORDER — POLYETHYLENE GLYCOL 3350 17 G/17G
17 POWDER, FOR SOLUTION ORAL DAILY
Status: DISCONTINUED | OUTPATIENT
Start: 2021-05-10 | End: 2021-05-12 | Stop reason: HOSPADM

## 2021-05-10 RX ORDER — HYDROMORPHONE HYDROCHLORIDE 1 MG/ML
0.2 INJECTION, SOLUTION INTRAMUSCULAR; INTRAVENOUS; SUBCUTANEOUS
Status: DISCONTINUED | OUTPATIENT
Start: 2021-05-10 | End: 2021-05-12 | Stop reason: HOSPADM

## 2021-05-10 RX ORDER — IBUPROFEN 200 MG
16-32 TABLET ORAL AS NEEDED
Status: DISCONTINUED | OUTPATIENT
Start: 2021-05-10 | End: 2021-05-12 | Stop reason: HOSPADM

## 2021-05-10 RX ORDER — DEXTROSE 40 %
15-30 GEL (GRAM) ORAL AS NEEDED
Status: DISCONTINUED | OUTPATIENT
Start: 2021-05-10 | End: 2021-05-10 | Stop reason: SDUPTHER

## 2021-05-10 RX ORDER — IBUPROFEN/PSEUDOEPHEDRINE HCL 200MG-30MG
3 TABLET ORAL NIGHTLY
Status: DISCONTINUED | OUTPATIENT
Start: 2021-05-10 | End: 2021-05-12 | Stop reason: HOSPADM

## 2021-05-10 RX ORDER — POLYETHYLENE GLYCOL 3350 17 G/17G
17 POWDER, FOR SOLUTION ORAL DAILY
Qty: 3 PACKET | Refills: 0
Start: 2021-05-10 | End: 2021-05-13

## 2021-05-10 RX ORDER — DEXTROSE 50 % IN WATER (D50W) INTRAVENOUS SYRINGE
25 AS NEEDED
Status: DISCONTINUED | OUTPATIENT
Start: 2021-05-10 | End: 2021-05-12 | Stop reason: HOSPADM

## 2021-05-10 RX ORDER — ALUMINUM HYDROXIDE, MAGNESIUM HYDROXIDE, AND SIMETHICONE 1200; 120; 1200 MG/30ML; MG/30ML; MG/30ML
30 SUSPENSION ORAL EVERY 4 HOURS PRN
Status: DISCONTINUED | OUTPATIENT
Start: 2021-05-10 | End: 2021-05-12 | Stop reason: HOSPADM

## 2021-05-10 RX ORDER — HYDROMORPHONE HYDROCHLORIDE 1 MG/ML
0.4 INJECTION, SOLUTION INTRAMUSCULAR; INTRAVENOUS; SUBCUTANEOUS
Status: DISCONTINUED | OUTPATIENT
Start: 2021-05-10 | End: 2021-05-12 | Stop reason: HOSPADM

## 2021-05-10 RX ORDER — DEXTROSE 50 % IN WATER (D50W) INTRAVENOUS SYRINGE
25 AS NEEDED
Status: DISCONTINUED | OUTPATIENT
Start: 2021-05-10 | End: 2021-05-10 | Stop reason: SDUPTHER

## 2021-05-10 RX ORDER — GLYCOPYRROLATE 0.6MG/3ML
SYRINGE (ML) INTRAVENOUS AS NEEDED
Status: DISCONTINUED | OUTPATIENT
Start: 2021-05-10 | End: 2021-05-10 | Stop reason: SURG

## 2021-05-10 RX ORDER — ONDANSETRON HYDROCHLORIDE 2 MG/ML
4 INJECTION, SOLUTION INTRAVENOUS
Status: DISCONTINUED | OUTPATIENT
Start: 2021-05-10 | End: 2021-05-10 | Stop reason: HOSPADM

## 2021-05-10 RX ORDER — ACETAMINOPHEN 325 MG/1
650 TABLET ORAL ONCE AS NEEDED
Status: DISCONTINUED | OUTPATIENT
Start: 2021-05-10 | End: 2021-05-10 | Stop reason: HOSPADM

## 2021-05-10 RX ORDER — AMOXICILLIN 250 MG
1 CAPSULE ORAL 2 TIMES DAILY
Status: DISCONTINUED | OUTPATIENT
Start: 2021-05-10 | End: 2021-05-12 | Stop reason: HOSPADM

## 2021-05-10 RX ORDER — PROPOFOL 10 MG/ML
INJECTION, EMULSION INTRAVENOUS AS NEEDED
Status: DISCONTINUED | OUTPATIENT
Start: 2021-05-10 | End: 2021-05-10 | Stop reason: SURG

## 2021-05-10 RX ORDER — OXYCODONE HYDROCHLORIDE 5 MG/1
10 TABLET ORAL EVERY 4 HOURS PRN
Status: DISCONTINUED | OUTPATIENT
Start: 2021-05-10 | End: 2021-05-12 | Stop reason: HOSPADM

## 2021-05-10 RX ORDER — ONDANSETRON HYDROCHLORIDE 2 MG/ML
4 INJECTION, SOLUTION INTRAVENOUS EVERY 8 HOURS PRN
Status: DISCONTINUED | OUTPATIENT
Start: 2021-05-10 | End: 2021-05-12 | Stop reason: HOSPADM

## 2021-05-10 RX ORDER — SENNOSIDES 8.6 MG/1
1 TABLET ORAL DAILY
Qty: 30 TABLET | Refills: 0
Start: 2021-05-10 | End: 2021-06-09

## 2021-05-10 RX ORDER — ONDANSETRON 4 MG/1
4 TABLET, ORALLY DISINTEGRATING ORAL EVERY 8 HOURS PRN
Status: DISCONTINUED | OUTPATIENT
Start: 2021-05-10 | End: 2021-05-12 | Stop reason: HOSPADM

## 2021-05-10 RX ORDER — EPHEDRINE SULFATE 50 MG/ML
INJECTION, SOLUTION INTRAVENOUS AS NEEDED
Status: DISCONTINUED | OUTPATIENT
Start: 2021-05-10 | End: 2021-05-10 | Stop reason: SURG

## 2021-05-10 RX ORDER — TRIMETHOBENZAMIDE HYDROCHLORIDE 300 MG/1
300 CAPSULE ORAL EVERY 6 HOURS PRN
Status: DISCONTINUED | OUTPATIENT
Start: 2021-05-10 | End: 2021-05-12 | Stop reason: HOSPADM

## 2021-05-10 RX ORDER — DEXAMETHASONE SODIUM PHOSPHATE 4 MG/ML
10 INJECTION, SOLUTION INTRA-ARTICULAR; INTRALESIONAL; INTRAMUSCULAR; INTRAVENOUS; SOFT TISSUE ONCE AS NEEDED
Status: DISCONTINUED | OUTPATIENT
Start: 2021-05-10 | End: 2021-05-12 | Stop reason: HOSPADM

## 2021-05-10 RX ORDER — LAMOTRIGINE 200 MG/1
200 TABLET ORAL DAILY
Status: DISCONTINUED | OUTPATIENT
Start: 2021-05-11 | End: 2021-05-12 | Stop reason: HOSPADM

## 2021-05-10 RX ORDER — DULOXETIN HYDROCHLORIDE 60 MG/1
60 CAPSULE, DELAYED RELEASE ORAL NIGHTLY
Status: DISCONTINUED | OUTPATIENT
Start: 2021-05-11 | End: 2021-05-11

## 2021-05-10 RX ORDER — PHENYLEPHRINE HYDROCHLORIDE 10 MG/ML
INJECTION INTRAVENOUS AS NEEDED
Status: DISCONTINUED | OUTPATIENT
Start: 2021-05-10 | End: 2021-05-10 | Stop reason: SURG

## 2021-05-10 RX ORDER — PHENYLEPHRINE HCL IN 0.9% NACL 50MG/250ML
PLASTIC BAG, INJECTION (ML) INTRAVENOUS CONTINUOUS PRN
Status: DISCONTINUED | OUTPATIENT
Start: 2021-05-10 | End: 2021-05-10 | Stop reason: SURG

## 2021-05-10 RX ORDER — EPINEPHRINE 1 MG/ML
INJECTION, SOLUTION INTRAMUSCULAR; SUBCUTANEOUS AS NEEDED
Status: DISCONTINUED | OUTPATIENT
Start: 2021-05-10 | End: 2021-05-10 | Stop reason: HOSPADM

## 2021-05-10 RX ORDER — CEFAZOLIN SODIUM 1 G/50ML
1 SOLUTION INTRAVENOUS
Status: COMPLETED | OUTPATIENT
Start: 2021-05-10 | End: 2021-05-12

## 2021-05-10 RX ORDER — ACETAMINOPHEN 325 MG/1
650 TABLET ORAL EVERY 6 HOURS PRN
Qty: 30 TABLET | Refills: 0
Start: 2021-05-10 | End: 2021-05-20

## 2021-05-10 RX ORDER — HYDROMORPHONE HYDROCHLORIDE 1 MG/ML
0.5 INJECTION, SOLUTION INTRAMUSCULAR; INTRAVENOUS; SUBCUTANEOUS
Status: DISCONTINUED | OUTPATIENT
Start: 2021-05-10 | End: 2021-05-10 | Stop reason: HOSPADM

## 2021-05-10 RX ORDER — FINASTERIDE 5 MG/1
5 TABLET, FILM COATED ORAL DAILY
Status: DISCONTINUED | OUTPATIENT
Start: 2021-05-10 | End: 2021-05-11

## 2021-05-10 RX ORDER — DEXTROSE 40 %
15-30 GEL (GRAM) ORAL AS NEEDED
Status: DISCONTINUED | OUTPATIENT
Start: 2021-05-10 | End: 2021-05-10 | Stop reason: HOSPADM

## 2021-05-10 RX ORDER — IBUPROFEN 200 MG
16-32 TABLET ORAL AS NEEDED
Status: DISCONTINUED | OUTPATIENT
Start: 2021-05-10 | End: 2021-05-10 | Stop reason: HOSPADM

## 2021-05-10 RX ORDER — DEXTROSE MONOHYDRATE AND SODIUM CHLORIDE 5; .9 G/100ML; G/100ML
INJECTION, SOLUTION INTRAVENOUS CONTINUOUS
Status: ACTIVE | OUTPATIENT
Start: 2021-05-10 | End: 2021-05-11

## 2021-05-10 RX ORDER — ACETAMINOPHEN 325 MG/1
975 TABLET ORAL 4 TIMES DAILY
Status: DISCONTINUED | OUTPATIENT
Start: 2021-05-10 | End: 2021-05-12 | Stop reason: HOSPADM

## 2021-05-10 RX ORDER — QUETIAPINE FUMARATE 200 MG/1
200 TABLET, FILM COATED ORAL NIGHTLY
COMMUNITY

## 2021-05-10 RX ORDER — GABAPENTIN 300 MG/1
300 CAPSULE ORAL 2 TIMES DAILY
Status: DISCONTINUED | OUTPATIENT
Start: 2021-05-10 | End: 2021-05-12 | Stop reason: HOSPADM

## 2021-05-10 RX ORDER — QUETIAPINE FUMARATE 200 MG/1
200 TABLET, FILM COATED ORAL NIGHTLY
Status: DISCONTINUED | OUTPATIENT
Start: 2021-05-10 | End: 2021-05-12 | Stop reason: HOSPADM

## 2021-05-10 RX ORDER — ROCURONIUM BROMIDE 10 MG/ML
INJECTION, SOLUTION INTRAVENOUS AS NEEDED
Status: DISCONTINUED | OUTPATIENT
Start: 2021-05-10 | End: 2021-05-10 | Stop reason: SURG

## 2021-05-10 RX ORDER — OXYCODONE HYDROCHLORIDE 5 MG/1
5 TABLET ORAL EVERY 4 HOURS PRN
Status: DISCONTINUED | OUTPATIENT
Start: 2021-05-10 | End: 2021-05-12 | Stop reason: HOSPADM

## 2021-05-10 RX ORDER — BUPIVACAINE HYDROCHLORIDE 5 MG/ML
INJECTION, SOLUTION EPIDURAL; INTRACAUDAL AS NEEDED
Status: DISCONTINUED | OUTPATIENT
Start: 2021-05-10 | End: 2021-05-10 | Stop reason: HOSPADM

## 2021-05-10 RX ORDER — ONDANSETRON HYDROCHLORIDE 2 MG/ML
INJECTION, SOLUTION INTRAVENOUS AS NEEDED
Status: DISCONTINUED | OUTPATIENT
Start: 2021-05-10 | End: 2021-05-10 | Stop reason: SURG

## 2021-05-10 RX ORDER — SODIUM CHLORIDE 9 MG/ML
INJECTION, SOLUTION INTRAVENOUS CONTINUOUS PRN
Status: DISCONTINUED | OUTPATIENT
Start: 2021-05-10 | End: 2021-05-10 | Stop reason: SURG

## 2021-05-10 RX ORDER — FENTANYL CITRATE 50 UG/ML
50 INJECTION, SOLUTION INTRAMUSCULAR; INTRAVENOUS
Status: COMPLETED | OUTPATIENT
Start: 2021-05-10 | End: 2021-05-10

## 2021-05-10 RX ORDER — DEXTROSE 50 % IN WATER (D50W) INTRAVENOUS SYRINGE
25 AS NEEDED
Status: DISCONTINUED | OUTPATIENT
Start: 2021-05-10 | End: 2021-05-10 | Stop reason: HOSPADM

## 2021-05-10 RX ORDER — DEXAMETHASONE SODIUM PHOSPHATE 4 MG/ML
INJECTION, SOLUTION INTRA-ARTICULAR; INTRALESIONAL; INTRAMUSCULAR; INTRAVENOUS; SOFT TISSUE AS NEEDED
Status: DISCONTINUED | OUTPATIENT
Start: 2021-05-10 | End: 2021-05-10 | Stop reason: SURG

## 2021-05-10 RX ORDER — LIDOCAINE HYDROCHLORIDE 10 MG/ML
INJECTION, SOLUTION INFILTRATION; PERINEURAL AS NEEDED
Status: DISCONTINUED | OUTPATIENT
Start: 2021-05-10 | End: 2021-05-10 | Stop reason: SURG

## 2021-05-10 RX ORDER — CEFAZOLIN SODIUM 2 G/50ML
2 SOLUTION INTRAVENOUS
Status: COMPLETED | OUTPATIENT
Start: 2021-05-10 | End: 2021-05-10

## 2021-05-10 RX ORDER — FENTANYL CITRATE 50 UG/ML
INJECTION, SOLUTION INTRAMUSCULAR; INTRAVENOUS AS NEEDED
Status: DISCONTINUED | OUTPATIENT
Start: 2021-05-10 | End: 2021-05-10 | Stop reason: SURG

## 2021-05-10 RX ORDER — IBUPROFEN 200 MG
800 TABLET ORAL EVERY 6 HOURS PRN
Start: 2021-05-25 | End: 2023-08-02

## 2021-05-10 RX ORDER — VANCOMYCIN HYDROCHLORIDE 500 MG/10ML
INJECTION, POWDER, LYOPHILIZED, FOR SOLUTION INTRAVENOUS AS NEEDED
Status: DISCONTINUED | OUTPATIENT
Start: 2021-05-10 | End: 2021-05-10 | Stop reason: HOSPADM

## 2021-05-10 RX ADMIN — FINASTERIDE 5 MG: 5 TABLET, FILM COATED ORAL at 21:37

## 2021-05-10 RX ADMIN — EPHEDRINE SULFATE 10 MG: 50 INJECTION, SOLUTION INTRAVENOUS at 13:15

## 2021-05-10 RX ADMIN — EPHEDRINE SULFATE 10 MG: 50 INJECTION, SOLUTION INTRAVENOUS at 13:14

## 2021-05-10 RX ADMIN — ROCURONIUM BROMIDE 50 MG: 10 INJECTION INTRAVENOUS at 12:37

## 2021-05-10 RX ADMIN — HYDROMORPHONE HYDROCHLORIDE 0.5 MG: 1 INJECTION, SOLUTION INTRAMUSCULAR; INTRAVENOUS; SUBCUTANEOUS at 15:00

## 2021-05-10 RX ADMIN — GABAPENTIN 300 MG: 300 CAPSULE ORAL at 19:59

## 2021-05-10 RX ADMIN — OXYCODONE HYDROCHLORIDE 5 MG: 5 TABLET ORAL at 18:17

## 2021-05-10 RX ADMIN — OXYCODONE HYDROCHLORIDE 5 MG: 5 TABLET ORAL at 19:53

## 2021-05-10 RX ADMIN — FENTANYL CITRATE 50 MCG: 50 INJECTION INTRAMUSCULAR; INTRAVENOUS at 16:19

## 2021-05-10 RX ADMIN — ONDANSETRON HYDROCHLORIDE 4 MG: 2 SOLUTION INTRAMUSCULAR; INTRAVENOUS at 13:49

## 2021-05-10 RX ADMIN — EPHEDRINE SULFATE 10 MG: 50 INJECTION, SOLUTION INTRAVENOUS at 13:32

## 2021-05-10 RX ADMIN — EPHEDRINE SULFATE 10 MG: 50 INJECTION, SOLUTION INTRAVENOUS at 12:53

## 2021-05-10 RX ADMIN — TAMSULOSIN HYDROCHLORIDE 0.4 MG: 0.4 CAPSULE ORAL at 21:30

## 2021-05-10 RX ADMIN — CEFAZOLIN 2 G: 330 INJECTION, POWDER, FOR SOLUTION INTRAMUSCULAR; INTRAVENOUS at 12:30

## 2021-05-10 RX ADMIN — Medication 50 MCG/MIN: at 13:25

## 2021-05-10 RX ADMIN — EPHEDRINE SULFATE 10 MG: 50 INJECTION, SOLUTION INTRAVENOUS at 13:12

## 2021-05-10 RX ADMIN — Medication 3 MG: at 23:07

## 2021-05-10 RX ADMIN — CEFAZOLIN SODIUM 1 G: 1 SOLUTION INTRAVENOUS at 21:32

## 2021-05-10 RX ADMIN — DEXTROSE AND SODIUM CHLORIDE 80 ML/HR: 5; 900 INJECTION, SOLUTION INTRAVENOUS at 20:00

## 2021-05-10 RX ADMIN — FENTANYL CITRATE 50 MCG: 50 INJECTION INTRAMUSCULAR; INTRAVENOUS at 14:30

## 2021-05-10 RX ADMIN — DEXAMETHASONE SODIUM PHOSPHATE 4 MG: 4 INJECTION, SOLUTION INTRAMUSCULAR; INTRAVENOUS at 12:59

## 2021-05-10 RX ADMIN — VANCOMYCIN HYDROCHLORIDE 1 G: 1 INJECTION, POWDER, LYOPHILIZED, FOR SOLUTION INTRAVENOUS at 12:11

## 2021-05-10 RX ADMIN — PHENYLEPHRINE HYDROCHLORIDE 50 MCG: 10 INJECTION INTRAVENOUS at 13:15

## 2021-05-10 RX ADMIN — FENTANYL CITRATE 100 MCG: 50 INJECTION, SOLUTION INTRAMUSCULAR; INTRAVENOUS at 13:03

## 2021-05-10 RX ADMIN — GLYCOPYRROLATE 0.2 MG: 0.2 INJECTION, SOLUTION INTRAMUSCULAR; INTRAVITREAL at 13:33

## 2021-05-10 RX ADMIN — ROCURONIUM BROMIDE 10 MG: 10 INJECTION INTRAVENOUS at 13:04

## 2021-05-10 RX ADMIN — EPHEDRINE SULFATE 10 MG: 50 INJECTION, SOLUTION INTRAVENOUS at 13:33

## 2021-05-10 RX ADMIN — QUETIAPINE 200 MG: 200 TABLET, FILM COATED ORAL at 21:33

## 2021-05-10 RX ADMIN — SODIUM CHLORIDE: 9 INJECTION, SOLUTION INTRAVENOUS at 12:30

## 2021-05-10 RX ADMIN — OXYCODONE HYDROCHLORIDE 10 MG: 5 TABLET ORAL at 23:07

## 2021-05-10 RX ADMIN — SUGAMMADEX 200 MG: 100 INJECTION, SOLUTION INTRAVENOUS at 14:08

## 2021-05-10 RX ADMIN — LIDOCAINE HYDROCHLORIDE 5 ML: 10 INJECTION, SOLUTION INFILTRATION; PERINEURAL at 12:36

## 2021-05-10 RX ADMIN — DOCUSATE SODIUM 50 MG AND SENNOSIDES 8.6 MG 1 TABLET: 8.6; 5 TABLET, FILM COATED ORAL at 21:29

## 2021-05-10 RX ADMIN — FENTANYL CITRATE 50 MCG: 50 INJECTION INTRAMUSCULAR; INTRAVENOUS at 18:13

## 2021-05-10 RX ADMIN — FENTANYL CITRATE 50 MCG: 50 INJECTION INTRAMUSCULAR; INTRAVENOUS at 14:45

## 2021-05-10 RX ADMIN — ROCURONIUM BROMIDE 10 MG: 10 INJECTION INTRAVENOUS at 13:38

## 2021-05-10 RX ADMIN — EPHEDRINE SULFATE 10 MG: 50 INJECTION, SOLUTION INTRAVENOUS at 13:16

## 2021-05-10 RX ADMIN — HYDROMORPHONE HYDROCHLORIDE 0.5 MG: 1 INJECTION, SOLUTION INTRAMUSCULAR; INTRAVENOUS; SUBCUTANEOUS at 16:25

## 2021-05-10 RX ADMIN — PROPOFOL INJECTABLE EMULSION 120 MG: 10 INJECTION, EMULSION INTRAVENOUS at 12:37

## 2021-05-10 RX ADMIN — ACETAMINOPHEN 975 MG: 325 TABLET, FILM COATED ORAL at 19:54

## 2021-05-10 RX ADMIN — FENTANYL CITRATE 100 MCG: 50 INJECTION, SOLUTION INTRAMUSCULAR; INTRAVENOUS at 12:37

## 2021-05-10 RX ADMIN — HYDROMORPHONE HYDROCHLORIDE 0.5 MG: 1 INJECTION, SOLUTION INTRAMUSCULAR; INTRAVENOUS; SUBCUTANEOUS at 15:24

## 2021-05-10 ASSESSMENT — COGNITIVE AND FUNCTIONAL STATUS - GENERAL
DO YOU HAVE SERIOUS DIFFICULTY WALKING OR CLIMBING STAIRS: YES
EATING MEALS: 4 - NONE
TOILETING: 2 - A LOT
HELP NEEDED FOR PERSONAL GROOMING: 3 - A LITTLE
HELP NEEDED FOR BATHING: 3 - A LITTLE
WALKING IN HOSPITAL ROOM: 1 - TOTAL
WALKING IN HOSPITAL ROOM: 3 - A LITTLE
STANDING UP FROM CHAIR USING ARMS: 2 - A LOT
CLIMB 3 TO 5 STEPS WITH RAILING: 3 - A LITTLE
CLIMB 3 TO 5 STEPS WITH RAILING: 1 - TOTAL
AFFECT: WFL
DRESSING REGULAR UPPER BODY CLOTHING: 2 - A LOT
MOVING TO AND FROM BED TO CHAIR: 2 - A LOT
DRESSING REGULAR LOWER BODY CLOTHING: 3 - A LITTLE
STANDING UP FROM CHAIR USING ARMS: 3 - A LITTLE
MOVING TO AND FROM BED TO CHAIR: 3 - A LITTLE

## 2021-05-10 ASSESSMENT — PATIENT HEALTH QUESTIONNAIRE - PHQ9: SUM OF ALL RESPONSES TO PHQ9 QUESTIONS 1 & 2: 0

## 2021-05-10 ASSESSMENT — LIFESTYLE VARIABLES: TOBACCO_USE: 1

## 2021-05-10 ASSESSMENT — ENCOUNTER SYMPTOMS: DEPRESSION: 1

## 2021-05-10 NOTE — HOSPITAL COURSE
Otis is a 71 y.o. male admitted on 5/10/2021 with Lumbar stenosis with neurogenic claudication [M48.062]  Lumbar radiculopathy [M54.16]. Principal problem is No Principal Problem: There is no principal problem currently on the Problem List. Please update the Problem List and refresh..    Past Medical History  Otis has a past medical history of Anxiety, Arthritis, Celiac disease, Depression, ED (erectile dysfunction), Fatigue, and Numbness.    History of Present Illness   s/p LUMBAR LAMINECTOMY L2-S1

## 2021-05-10 NOTE — ANESTHESIA PROCEDURE NOTES
Airway  Urgency: elective    Start Time: 5/10/2021 12:46 PM  Airway not difficult    General Information and Staff    Patient location during procedure: OR    Indications and Patient Condition  Indications for airway management: anesthesia  Sedation level: deep  Preoxygenated: yes  Patient position: sniffing  MILS maintained throughout  Mask difficulty assessment: 1 - vent by mask    Final Airway Details  Final airway type: endotracheal airway      Successful airway: ETT  Cuffed: yes   Successful intubation technique: video laryngoscopy  Facilitating devices/methods: intubating stylet  Endotracheal tube insertion site: oral  Blade type: glidescope.  Blade size: #4  ETT size (mm): 7.5  Cormack-Lehane Classification: grade I - full view of glottis  Placement verified by: chest auscultation and capnometry   Measured from: lips  ETT to lips (cm): 22  Number of attempts at approach: 1  Number of other approaches attempted: 0  Atraumatic airway insertion

## 2021-05-10 NOTE — ANESTHESIA POSTPROCEDURE EVALUATION
Patient: Otis Mccoy    Procedure Summary     Date: 05/10/21 Room / Location: [facility] OR 12 / [facility] OR    Anesthesia Start: 1231 Anesthesia Stop: 1422    Procedure: LUMBAR LAMINECTOMY L2-S1 (Bilateral Spine Lumbar) Diagnosis:       Lumbar stenosis with neurogenic claudication      Lumbar radiculopathy      (Lumbar stenosis with neurogenic claudication [M48.062])      (Lumbar radiculopathy [M54.16])    Surgeons: Benjamin Reyes MD Responsible Provider: Mckenna Gregg MD    Anesthesia Type: general ASA Status: 2          Anesthesia Type: general  PACU Vitals  5/10/2021 1416 - 5/10/2021 1506      5/10/2021  1440             BP:  (!) 142/68    Temp:  36.6 °C (97.8 °F)    Pulse:  69    Resp:  20    SpO2:  100 %            Anesthesia Post Evaluation    Pain management: adequate  Patient location during evaluation: PACU  Patient participation: complete - patient participated  Level of consciousness: awake and alert  Cardiovascular status: acceptable  Airway Patency: adequate  Respiratory status: acceptable  Hydration status: acceptable  Anesthetic complications: no

## 2021-05-10 NOTE — PROGRESS NOTES
Patient: Otis Mccoy  Location: [facility] Operating Room OR  MRN: [id number]  Today's date: 5/10/2021    Pt left in bed, alarmed, with personal items and call bell within reach. RN made aware.    Otis is a 71 y.o. male admitted on 5/10/2021 with Lumbar stenosis with neurogenic claudication [M48.062]  Lumbar radiculopathy [M54.16]. Principal problem is No Principal Problem: There is no principal problem currently on the Problem List. Please update the Problem List and refresh..    Past Medical History  Otis has a past medical history of Anxiety, Arthritis, Celiac disease, Depression, ED (erectile dysfunction), Fatigue, and Numbness.    History of Present Illness   s/p LUMBAR LAMINECTOMY L2-S1       PT Vitals    Date/Time Pulse HR Source Resp SpO2 Pt Activity O2 Therapy BP MAP Pt Position [facility]   05/10/21 1542 72 Monitor -- 97 % At rest None (Room air) 129/57 -- Lying [facility]   05/10/21 1545 72 -- 14 97 % -- -- 129/57 89 mmHg --    05/10/21 1600 74 -- 25 98 % -- -- -- -- -- JW      PT Pain    Date/Time Pain Type Location Rating: Rest Rating: Activity Rating: Activity [facility]   05/10/21 1542 Pain Assessment back 6 6 -- [facility]   05/10/21 1545 Pain Assessment -- -- -- 2 - mild pain    05/10/21 1600 Post Activity -- -- -- 4 - moderate pain           Prior Living Environment      Most Recent Value   Current Living Arrangements  home/apartment/condo   Living Environment Comment  pt lives alone in apartment with 14STE, tub shower          Prior Level of Function      Most Recent Value   Dominant Hand  right   Ambulation  independent   Transferring  independent   Toileting  independent   Bathing  independent   Dressing  independent   Eating  independent   Communication  understands/communicates without difficulty   Assistive Device Currently Used at Home  none          PT Evaluation and Treatment - 05/10/21 1542        PT Time Calculation    Start Time  1542     Stop Time  1605     Time Calculation (min)  23  min        Session Details    Document Type  initial evaluation     Mode of Treatment  physical therapy        General Information    Patient Profile Reviewed  yes     Existing Precautions/Restrictions  fall;spinal        Cognition/Psychosocial    Affect/Mental Status (Cognition)  WFL     Orientation Status (Cognition)  oriented x 4        Sensory Assessment (Somatosensory)    Sensory Assessment (Somatosensory)  LE sensation intact        Range of Motion (ROM)    Range of Motion  ROM is WFL        Strength (Manual Muscle Testing)    Strength (Manual Muscle Testing)  strength is WFL        Bed Mobility    [place], Supine to Sit  minimum assist (75% or more patient effort);1 person assist     [place], Sit to Supine  minimum assist (75% or more patient effort);1 person assist     Assistive Device  bed rails     Comment (Bed Mobility)  via log roll technique, assist to bring trunk to upright        Sit to Stand Transfer    [place], Sit to Stand Transfer  supervision     Verbal Cues  hand placement     Assistive Device  walker, front-wheeled     Comment  STS x2 from bed and toilet        Stand to Sit Transfer    [place], Stand to Sit Transfer  supervision     Verbal Cues  hand placement;technique     Assistive Device  walker, front-wheeled     Comment  good eccentric control        Gait Training    [place], Gait  supervision     Assistive Device  walker, front-wheeled     Distance in Feet  85 feet    x2, seated rest break between bouts    Pattern (Gait)  step-through     Deviations/Abnormal Patterns (Gait)  antalgic;gait speed decreased;step length decreased     Comment (Gait/Stairs)  multiple standing rest breaks required 2/2 pain, although no overt LOB        Stairs Training    [place], Stairs  unable to assess        Balance    Balance Assessment  sitting static balance;sit to stand dynamic balance;standing static balance;standing dynamic balance     Static Sitting Balance  WFL      Dynamic Sitting Balance  WFL     Sit to Stand Dynamic Balance  WFL     Static Standing Balance  WFL     Comment, Balance  with RW        AM-PAC (TM) - Mobility (Current Function)    Turning from your back to your side while in a flat bed without using bedrails?  3 - A Little     Moving from lying on your back to sitting on the side of a flat bed without using bedrails?  3 - A Little     Moving to and from a bed to a chair?  3 - A Little     Standing up from a chair using your arms?  3 - A Little     To walk in a hospital room?  3 - A Little     Climbing 3-5 steps with a railing?  3 - A Little     AM-PAC (TM) Mobility Score  18        Therapy Assessment/Plan (PT)    Rehab Potential (PT)  good, to achieve stated therapy goals     Therapy Frequency (PT)  5-7 times/wk        Progress Summary (PT)    Daily Outcome Statement (PT)  pt requires supervision with mobility, grossly limited by pain. Rec home with assist and home health when stable for d/c        Therapy Plan Review/Discharge Plan (PT)    PT Recommended Discharge Disposition  home with assist;home with home health     Anticipated Equipment Needs at Discharge (PT)  walker, front-wheeled    pending progress                      Education Documentation  Home Safety, taught by Maynor Mcgraw, PT at 5/10/2021  4:50 PM.  Learner: Patient  Readiness: Acceptance  Method: Explanation  Response: Verbalizes Understanding  Comment: safety with mobility          PT Goals      Most Recent Value   Bed Mobility Goal 1   Activity/Assistive Device  bed mobility activities, all at 05/10/2021 1542   [place]  independent at 05/10/2021 1542   Time Frame  by discharge at 05/10/2021 1542   Progress/Outcome  goal ongoing at 05/10/2021 1542   Transfer Goal 1   Activity/Assistive Device  all transfers at 05/10/2021 1542   [place]  independent at 05/10/2021 1542   Time Frame  by discharge at 05/10/2021 1542   Progress/Outcome  goal ongoing at 05/10/2021 1542   Gait  Training Goal 1   Activity/Assistive Device  gait (walking locomotion) at 05/10/2021 1542   [place]  independent at 05/10/2021 1542   Distance  250 feet at 05/10/2021 1542   Time Frame  by discharge at 05/10/2021 1542   Progress/Outcome  goal ongoing at 05/10/2021 1542   Stairs Goal 1   Activity/Assistive Device  stairs, all skills at 05/10/2021 1542   Will  independent at 05/10/2021 1542   Number of Stairs  14 at 05/10/2021 1542   Time Frame  by discharge at 05/10/2021 1542   Progress/Outcome  goal ongoing at 05/10/2021 1542

## 2021-05-10 NOTE — ANESTHESIOLOGIST PRE-PROCEDURE ATTESTATION
Pre-Procedure Patient Identification:  I am the Primary Anesthesiologist and have identified the patient on 05/10/21 at 2:10 PM.   I have confirmed the following procedure(s) LUMBAR LAMINECTOMY L2-S1 (B) will be performed by the following surgeon/proceduralist Benjamin Reyes MD.

## 2021-05-10 NOTE — DISCHARGE INSTRUCTIONS
"     [facility] [address], [address]  [place] [address]   [phone]    DISCECTOMY/LAMINECTOMY DISCHARGE INSTRUCTIONS   Surgeon: Dr. Benjamin Reyes MD          You had the following procedure performed at [facility] on 5/10/2021    Procedure: Procedure(s):  LUMBAR LAMINECTOMY L2-S1 SPINE DISCHARGE INSTRUCTIONS       Please call to schedule your follow up appointment with Benjamin Reyes MD at [phone]. You have staples in place and will return to the office 2 weeks from date of surgery.         Please arrive to [facility] apprx 1 hour prior to your scheduled office visit with Dr. Reyes to have an X-ray of your lumbar spine performed.  A prescription will be given to you for this test before you are discharged from the hospital.  Go to outpatient radiology to have the X-ray done.  No appointment or special insurance imaging precertification is needed, it is \"walk-in\".  Dr. Reyes will be able to view the films during your office visit over the radiology intranet.     Back Precautions:  Please read the following instructions regarding physical activity the first few weeks after surgery:     1. You may be out of bed walking as much as you feel able.  Try to limit stair climbing to a few times daily.   2. No bending from the waist.  If you must bend, squat to  objects while keeping your back straight.   3. Limit lifting to less than 10 lbs. Do not twist when you lift and carry; instead pivot with your feet.   4. No exercises, unless prescribed by Dr. Reyes, are to be done until your first office visit.  This is to allow your back muscles time to heal.    5. No sexual relations until your return office visit.   6. You may travel home as a passenger, reclining or lying down.  No further automobile travel until your return office visit.   7. No tub baths.    1. If a drain was placed, You will typically be " able to shower 3 days after drain removal as long as there is no drainage from drain site.  1. It is not necessary to cover your incision for a shower. No swimming.  2. If you did not have a drain placed you will typically be able to shower 3 days after the surgery as long as there is no drainage from incision.  1. It is not necessary to cover your incision for a shower. No swimming.  8. Limit sitting to 20 or 30 minutes at a time.  Sitting should be done primarily for meals and in a firm chair with a back support.   9. No work or school until cleared by Dr. Reyes.  10. OK to resume NSAIDs / OTC Vitamins after your first Post-Op Visit   Please call Dr. Reyes if you have any of the following signs and symptoms:  1. Any increased pain in your back or legs: pain that is different from pain before surgery.  Any pain unrelieved by rest and/or pain medication.   2. Any opening, drainage, redness or swelling at your incision site.   3. Any increase in body temperature over 100.5°.   Helpful Hints:  1. If you become tired or experience low back discomfort, go to bed immediately.   2. Pain similar to your pre-operative pain is not uncommon and generally resolves in the weeks following your surgery.   3. Upon your return office visit Dr. Reyes will direct your further care and any necessary rehabilitation program.    4. Take a mild laxative while taking narcotics.      If you have any further question or problems, please do not hesitate to call Stephanie Vogel RN, MS @ [phone].     ***NIRMAL Hose stockings were applied to your legs after surgery.  Wear the stockings until follow up in the office.  OK to remove for hygiene      Acute Post-Operative Pain Management   In accordance with the Center for Disease Control and the National [place] of Health, Dr. Reyes will be limiting prescription refills for post-operative pain medication to 1 Refill.     It is important that you count your remaining pills and utilize the  lowest effective dose of pain medication.        What this means for you:   In most cases, it is safe to abruptly stop post-operative pain medication without tapering down.   Physical Dependence, including symptoms of withdrawal, does not mean you are addicted it may just mean your body has become used to the medicine and needs time to adjust.          You will need to establish care with a pain management Doctor IF you are running low on pain medication AND have already received a refill after surgery.            The chart below shows an example of a tapering regimen. Here, a patient who is taking ten tablets per day (2 every 4 hours) reduces use by lowering one tablet every 3 to 4 days until he or she is down to five doses per day or less(one tablet every 4 hours).            POTENTIAL SIDE EFFECTS OF OPIOIDS    • Tolerance (taking more of the medication for the same pain relief)    • Physical dependence (symptoms of withdrawal when the medication is stopped)    • Increased sensitivity to pain    • Constipation    • Nausea, vomiting, and dry mouth    • Sleepiness and dizziness    • Confusion    • Depression    • Low levels of testosterone that can result in lower sex drive, energy, and strength    • Itching and sweating       If you are Given a Muscle Relaxer  • It is safe to use both Pain Medication and a Muscle Relaxer together, however:  o It is important to Identify the Type of Pain you experience to help guide you in taking the right drug  o Use Muscle Relaxer for Pain in the Neck/Back/Buttock that feels like Tightness/Stiffness/Spasm/Broad Aching Pain  o Use Pain Medication for Primarily Incisional Pain  o It is OK to take ONLY the Pain Medication or Only the Muscle Relaxer         How To Use an Incentive Spirometer    Use the Incentive Spirometer 10 times an hour.  This can be broken up into 3 times every 15 minutes or so.   If you develop a fever, take Tylenol as directed.    CONTINUE use of Incentive  Spirometer for a full day after the fever.  This will promote clearing the lungs which in turn will prevent fever/chills.    An incentive spirometer is a tool that measures how well you are filling your lungs with each breath. Learning to take long, deep breaths using this tool can help you keep your lungs clear and active. This may help to reverse or lessen your chance of developing breathing (pulmonary) problems, especially infection.       What are the risks?  · Breathing too quickly may cause dizziness or cause you to pass out. Take your time so you do not get dizzy or light-headed.  · If you are in pain, you may need to take pain medicine before doing incentive spirometry. It is harder to take a deep breath if you are having pain.  How to use your incentive spirometer    1. Sit up on the edge of your bed or on a chair.  2. Hold the incentive spirometer so that it is in an upright position.  3. Before you use the spirometer, breathe out normally.  4. Place the mouthpiece in your mouth. Make sure your lips are closed tightly around it.  5. Breathe in slowly and as deeply as you can through your mouth, causing the piston or the ball to rise toward the top of the chamber.  6. Hold your breath for 3-5 seconds, or for as long as possible.  ? On the Far RIGHT is a  indicator, use this to guide you in breathing. Keep the blue ball in between the arrows to make sure you injale properly Slow down your breathing if the indicator goes above the marked areas.  7. Remove the mouthpiece from your mouth and breathe out normally. The piston or ball will return to the bottom of the chamber.  8. Rest for a few seconds, then repeat the steps 10 or more times.  ? Take your time and take a few normal breaths between deep breaths so that you do not get dizzy or light-headed.  ? Do this every 1 hours when you are awake.  9. If the spirometer includes a goal marker to show the highest number you have reached (best effort), use  this as a goal to work toward during each repetition.  10. After each set of 10 deep breaths, cough a few times. This will help to make sure that your lungs are clear.  ? If you have an incision on your chest or abdomen from surgery, place a pillow or a rolled-up towel firmly against the incision when you cough. This can help to reduce pain from coughing.  General tips  · When you become able to get out of bed, walk around often and continue to cough to help clear your lungs.  · Keep using the incentive spirometer until your health care provider says it is okay to stop using it. If you have been in the hospital, you may be told to keep using the spirometer at home.  Contact a health care provider if:  · You have a fever.  · You develop shortness of breath.  Get help right away if:  · You develop a cough with bloody mucus from the lungs (bloody sputum).  · You have fluid or blood coming from an incision site after you cough.  Summary  · An incentive spirometer is a tool that can help you learn to take long, deep breaths to keep your lungs clear and active.  · You may be asked to use a spirometer after a surgery, if you have a lung problem or a history of smoking, or if you have been inactive for a long period of time.  · Use your incentive spirometer as instructed every 1 hours while you are awake.  · If you have an incision on your chest or abdomen, place a pillow or a rolled-up towel firmly against your incision when you cough. This will help to reduce pain.  This information is not intended to replace advice given to you by your health care provider. Make sure you discuss any questions you have with your health care provider.  Document Released: 04/29/2008 Document Revised: 01/10/2019 Document Reviewed: 10/31/2018  [employer] Interactive Patient Education © 2019 [employer]

## 2021-05-10 NOTE — ANESTHESIA PREPROCEDURE EVALUATION
Relevant Problems   MUSCULOSKELETAL   (+) Lumbar degenerative disc disease      NEUROLOGY   (+) History of atrial myxoma   (+) Lumbar radiculopathy      RESPIRATORY SYSTEM   (+) Smoker      URINARY SYSTEM   (+) BPH (benign prostatic hyperplasia)       Anesthesia ROS/MED HX    Anesthesia History    Previous anesthetics  No history of anesthetic complications  Pulmonary    history of tobacco use  Neuro/Psych    Depression   Bipolar disorder   Anxiety   Chronic neuropathic pain  Cardiovascular   Cardiac clearance reviewed, Covid19 Test Reviewed and ECG reviewed  Hematological    anemia  Musculoskeletal   Osteoarthritis  Renal Disease   BPH  Endo/Other  Body Habitus: Normal  ROS/MED HX Comments:    Cardiology: Hx of atrial myxoma s/p resection   GI/Hepatic/Renal: Celiac disease       Past Surgical History:   Procedure Laterality Date   • CARDIAC SURGERY     • CATARACT EXTRACTION, BILATERAL      hx of   • KNEE ARTHROPLASTY      left knee       Physical Exam    Airway   Mallampati: II   TM distance: >3 FB   Neck ROM: full  Cardiovascular - normal   Rhythm: regular   Rate: normalPulmonary - normal   clear to auscultation  Dental    Teeth Problems: missing        Anesthesia Plan    Plan: general    Technique: general endotracheal     Lines and Monitors: PIV and additional IV   ASA 2  Anesthetic plan and risks discussed with: patient

## 2021-05-10 NOTE — ANESTHESIOLOGIST PRE-PROCEDURE ATTESTATION
Pre-Procedure Patient Identification:  I am the Primary Anesthesiologist and have identified the patient on 05/10/21 at 11:38 AM.   I have confirmed the following procedure(s) LUMBAR LAMINECTOMY L2-S1 (B) will be performed by the following surgeon/proceduralist Benjmain Reyes MD.

## 2021-05-11 PROCEDURE — 63700000 HC SELF-ADMINISTRABLE DRUG: Performed by: STUDENT IN AN ORGANIZED HEALTH CARE EDUCATION/TRAINING PROGRAM

## 2021-05-11 PROCEDURE — 97166 OT EVAL MOD COMPLEX 45 MIN: CPT | Mod: GO

## 2021-05-11 PROCEDURE — 63600000 HC DRUGS/DETAIL CODE: Performed by: STUDENT IN AN ORGANIZED HEALTH CARE EDUCATION/TRAINING PROGRAM

## 2021-05-11 PROCEDURE — 200200 PR NO CHARGE: Performed by: ORTHOPAEDIC SURGERY

## 2021-05-11 PROCEDURE — 63700000 HC SELF-ADMINISTRABLE DRUG: Performed by: ORTHOPAEDIC SURGERY

## 2021-05-11 PROCEDURE — 12000000 HC ROOM AND CARE MED/SURG

## 2021-05-11 PROCEDURE — 93005 ELECTROCARDIOGRAM TRACING: CPT | Performed by: STUDENT IN AN ORGANIZED HEALTH CARE EDUCATION/TRAINING PROGRAM

## 2021-05-11 PROCEDURE — 97116 GAIT TRAINING THERAPY: CPT | Mod: GP,CQ

## 2021-05-11 PROCEDURE — 97530 THERAPEUTIC ACTIVITIES: CPT | Mod: GP,CQ

## 2021-05-11 RX ORDER — TAMSULOSIN HYDROCHLORIDE 0.4 MG/1
0.4 CAPSULE ORAL DAILY
Status: DISCONTINUED | OUTPATIENT
Start: 2021-05-11 | End: 2021-05-12 | Stop reason: HOSPADM

## 2021-05-11 RX ORDER — FINASTERIDE 5 MG/1
5 TABLET, FILM COATED ORAL DAILY
Status: DISCONTINUED | OUTPATIENT
Start: 2021-05-11 | End: 2021-05-12 | Stop reason: HOSPADM

## 2021-05-11 RX ORDER — TIZANIDINE 2 MG/1
2 TABLET ORAL EVERY 8 HOURS PRN
Status: DISCONTINUED | OUTPATIENT
Start: 2021-05-11 | End: 2021-05-12 | Stop reason: HOSPADM

## 2021-05-11 RX ORDER — DULOXETIN HYDROCHLORIDE 60 MG/1
60 CAPSULE, DELAYED RELEASE ORAL NIGHTLY
Status: DISCONTINUED | OUTPATIENT
Start: 2021-05-11 | End: 2021-05-12 | Stop reason: HOSPADM

## 2021-05-11 RX ADMIN — GABAPENTIN 300 MG: 300 CAPSULE ORAL at 08:50

## 2021-05-11 RX ADMIN — GABAPENTIN 300 MG: 300 CAPSULE ORAL at 19:40

## 2021-05-11 RX ADMIN — OXYCODONE HYDROCHLORIDE 10 MG: 5 TABLET ORAL at 20:28

## 2021-05-11 RX ADMIN — CEFAZOLIN SODIUM 1 G: 1 SOLUTION INTRAVENOUS at 11:42

## 2021-05-11 RX ADMIN — QUETIAPINE 200 MG: 200 TABLET, FILM COATED ORAL at 21:44

## 2021-05-11 RX ADMIN — TIZANIDINE 2 MG: 2 TABLET ORAL at 20:28

## 2021-05-11 RX ADMIN — Medication 3 MG: at 21:44

## 2021-05-11 RX ADMIN — OXYCODONE HYDROCHLORIDE 10 MG: 5 TABLET ORAL at 10:48

## 2021-05-11 RX ADMIN — ACETAMINOPHEN 975 MG: 325 TABLET, FILM COATED ORAL at 21:44

## 2021-05-11 RX ADMIN — ACETAMINOPHEN 975 MG: 325 TABLET, FILM COATED ORAL at 14:22

## 2021-05-11 RX ADMIN — OXYCODONE HYDROCHLORIDE 10 MG: 5 TABLET ORAL at 16:21

## 2021-05-11 RX ADMIN — TAMSULOSIN HYDROCHLORIDE 0.4 MG: 0.4 CAPSULE ORAL at 21:44

## 2021-05-11 RX ADMIN — FINASTERIDE 5 MG: 5 TABLET, FILM COATED ORAL at 21:44

## 2021-05-11 RX ADMIN — ACETAMINOPHEN 975 MG: 325 TABLET, FILM COATED ORAL at 17:36

## 2021-05-11 RX ADMIN — DOCUSATE SODIUM 50 MG AND SENNOSIDES 8.6 MG 1 TABLET: 8.6; 5 TABLET, FILM COATED ORAL at 08:50

## 2021-05-11 RX ADMIN — POLYETHYLENE GLYCOL 3350 17 G: 17 POWDER, FOR SOLUTION ORAL at 08:50

## 2021-05-11 RX ADMIN — HYDROMORPHONE HYDROCHLORIDE 0.4 MG: 1 INJECTION, SOLUTION INTRAMUSCULAR; INTRAVENOUS; SUBCUTANEOUS at 04:33

## 2021-05-11 RX ADMIN — OXYCODONE HYDROCHLORIDE 10 MG: 5 TABLET ORAL at 03:31

## 2021-05-11 RX ADMIN — DULOXETINE 60 MG: 60 CAPSULE, DELAYED RELEASE ORAL at 09:29

## 2021-05-11 RX ADMIN — DOCUSATE SODIUM 50 MG AND SENNOSIDES 8.6 MG 1 TABLET: 8.6; 5 TABLET, FILM COATED ORAL at 19:40

## 2021-05-11 RX ADMIN — CEFAZOLIN SODIUM 1 G: 1 SOLUTION INTRAVENOUS at 19:40

## 2021-05-11 RX ADMIN — TIZANIDINE 2 MG: 2 TABLET ORAL at 11:41

## 2021-05-11 RX ADMIN — LAMOTRIGINE 200 MG: 200 TABLET ORAL at 08:50

## 2021-05-11 RX ADMIN — ACETAMINOPHEN 975 MG: 325 TABLET, FILM COATED ORAL at 08:49

## 2021-05-11 RX ADMIN — CEFAZOLIN SODIUM 1 G: 1 SOLUTION INTRAVENOUS at 04:33

## 2021-05-11 RX ADMIN — OXYCODONE HYDROCHLORIDE 10 MG: 5 TABLET ORAL at 06:56

## 2021-05-11 ASSESSMENT — COGNITIVE AND FUNCTIONAL STATUS - GENERAL
DRESSING REGULAR UPPER BODY CLOTHING: 3 - A LITTLE
WALKING IN HOSPITAL ROOM: 3 - A LITTLE
MOVING TO AND FROM BED TO CHAIR: 3 - A LITTLE
STANDING UP FROM CHAIR USING ARMS: 3 - A LITTLE
CLIMB 3 TO 5 STEPS WITH RAILING: 3 - A LITTLE
DRESSING REGULAR LOWER BODY CLOTHING: 2 - A LOT
AFFECT: WFL
HELP NEEDED FOR PERSONAL GROOMING: 4 - NONE
TOILETING: 3 - A LITTLE
EATING MEALS: 4 - NONE
HELP NEEDED FOR BATHING: 3 - A LITTLE

## 2021-05-11 NOTE — PROGRESS NOTES
Patient: Otis Mccoy  Location: [place] [address]  MRN: [id number]  Today's date: 5/11/2021     Pt seated in chair with chair alarm on, call bell within reach, and RN notified.  Pt may benefit from LHAE education/training.    Otis is a 71 y.o. male admitted on 5/10/2021 with Lumbar stenosis with neurogenic claudication [M48.062]  Lumbar radiculopathy [M54.16]. Principal problem is No Principal Problem: There is no principal problem currently on the Problem List. Please update the Problem List and refresh..    Past Medical History  Otis has a past medical history of Anxiety, Arthritis, Celiac disease, Depression, ED (erectile dysfunction), Fatigue, and Numbness.    History of Present Illness   s/p LUMBAR LAMINECTOMY L2-S1       OT Vitals    Date/Time Pulse HR Source [facility]   05/11/21 0901 65 Monitor JLD      OT Pain    Date/Time Pain Type Location Rating: Rest Rating: Activity Interventions [facility]   05/11/21 0901 Pain Assessment back 9 9 position adjusted JLD          Prior Living Environment      Most Recent Value   People in Home  alone   Current Living Arrangements  home/apartment/condo   Living Environment Comment  [address], spt, lives alone, raised toilet seat, tub shower          Prior Level of Function      Most Recent Value   Dominant Hand  right   Ambulation  independent   Transferring  independent   Toileting  independent   Bathing  independent   Dressing  independent   Eating  independent   Communication  understands/communicates without difficulty   Prior Level of Function Comment  pt ind func transfers, ind ADLs   Assistive Device Currently Used at Home  raised toilet              OT Evaluation and Treatment - 05/11/21 0901        OT Time Calculation    Start Time  0901     Stop Time  0920     Time Calculation (min)  19 min        Session Details    Document Type  initial evaluation     Mode of Treatment  occupational therapy        General Information    Patient Profile Reviewed   yes     General Observations of Patient  pt rec'd seated OOB in chair, agreeable to therapy     Existing Precautions/Restrictions  fall;spinal        Cognition/Psychosocial    Affect/Mental Status (Cognition)  WFL     Orientation Status (Cognition)  oriented x 4     Follows Commands (Cognition)  WFL     Cognitive Function  WFL        Sensory Assessment (Somatosensory)    Sensory Assessment (Somatosensory)  UE sensation intact        Range of Motion (ROM)    Range of Motion  ROM is WFL;bilateral upper extremities        Strength (Manual Muscle Testing)    Strength (Manual Muscle Testing)  strength is WFL;bilateral upper extremities        Sit to Stand Transfer    Mayes, Sit to Stand Transfer  supervision     Assistive Device  walker, karen        Stand to Sit Transfer    Mayes, Stand to Sit Transfer  supervision     Assistive Device  walker, front-wheeled        Toilet Transfer    Comment  pt owns elevated toilets at home        Upper Body Dressing    Tasks  don;pajama/robe     Mayes  close supervision     Position  supported sitting        Lower Body Dressing    Tasks  don;socks     Mayes  maximum assist (25-49% patient effort)     Position  supported sitting     Comment  pt unable to perform cross leg tech 2* pain, rec LHAE education pending progress        Toileting    Mayes  supervision     Position  supported standing        BADL Safety/Performance    Skilled BADL Treatment/Intervention  compensatory training;BADL process/adaptation training        AM-PAC (TM) - ADL (Current Function)    Putting on and taking off regular lower body clothing?  2 - A Lot     Bathing?  3 - A Little     Toileting?  3 - A Little     Putting on/taking off regular upper body clothing?  3 - A Little     How much help for taking care of personal grooming?  4 - None     Eating meals?  4 - None     AM-PAC (TM) ADL Score  19        Therapy Assessment/Plan (OT)    Rehab Potential (OT)  good, to achieve  stated therapy goals     Therapy Frequency (OT)  5 times/wk     Criteria for Skilled Therapeutic Interventions Met (OT)  yes        Progress Summary (OT)    Daily Outcome Statement (OT)  OT Eval, pt sup func transfers, maxA LB ADLs, pt limited by pain, decr standing aldo, pt aware of spinal precs, pt may benefit from LHAE for LB dressing, rec contin skilled OT     Symptoms Noted During/After Treatment  none        Therapy Plan Review/Discharge Plan (OT)    OT Recommended Discharge Disposition  home with assist;home with home health     Anticipated Equipment Needs At Discharge (OT)  commode, 3-in-1;dressing equipment;walker, front-wheeled;shower chair     Therapy Plan Review (OT)  evaluation/treatment results reviewed;patient                  Education Documentation  Home Safety, taught by Roseline Ferraro, OT at 5/11/2021  9:27 AM.  Learner: Patient  Readiness: Acceptance  Method: Explanation  Response: Verbalizes Understanding  Comment: safe transfers               OT Goals      Most Recent Value   Transfer Goal 1   Activity/Assistive Device  all transfers at 05/11/2021 0901   [place]  modified independence at 05/11/2021 0901   Time Frame  by discharge at 05/11/2021 0901   Progress/Outcome  goal ongoing at 05/11/2021 0901   Bathing Goal 1   Activity/Assistive Device  bathing skills, all at 05/11/2021 0901   [place]  modified independence at 05/11/2021 0901   Time Frame  by discharge at 05/11/2021 0901   Progress/Outcome  goal ongoing at 05/11/2021 0901   Dressing Goal 1   Activity/Adaptive Equipment  dressing skills, all at 05/11/2021 0901   [place]  modified independence at 05/11/2021 0901   Time Frame  by discharge at 05/11/2021 0901   Progress/Outcome  goal ongoing at 05/11/2021 0901   Toileting Goal 1   Activity/Assistive Device  toileting skills, all at 05/11/2021 0901   [place]  modified independence at 05/11/2021 0901   Time Frame  by discharge at 05/11/2021 0901   Progress/Outcome  goal  ongoing at 05/11/2021 0901

## 2021-05-11 NOTE — PLAN OF CARE
Problem: Adult Inpatient Plan of Care  Goal: Plan of Care Review  Flowsheets (Taken 5/11/2021 1000)  Progress: improving  Plan of Care Reviewed With: patient  Outcome Summary: Anticipate d/c to his sister's home in [place] once stable.     Problem: Adult Inpatient Plan of Care  Goal: Readiness for Transition of Care  Intervention: Mutually Develop Transition Plan  Flowsheets (Taken 5/11/2021 1000)  Anticipated Discharge Disposition: family member's home  Discharge Coordination/Progress: Patient is POD 1 lumbar lami L2-S1. Met with patient bedside to discuss potential d/c needs. Patient lives alone in an apartment with [address] in [place], [place]. He denies hx of C and [facility], went to [facility] years ago. His plan is to stay with his sister in [place] because he cannot drive himself to [place], and his sister can transport and assist him locally until he is independent. At his sister's home, there are [address], a powder room and FF to second floor. Verified pharmacy and PCP.  Assistive Device/Animal Currently Used at Home: none  Anticipated Changes Related to Illness: inability to care for self  Current Discharge Risk: lives alone  Readmission Within the Last 30 Days: no previous admission in last 30 days  Patient/Family Anticipates Transition to: family members' home  Transportation Anticipated: family or friend will provide  Concerns to be Addressed: denies needs/concerns at this time     Addendum 1102: Patient discussed in CPR. Anticipate d/c to his sister's house Thurs vs more likely Fri, no needs.

## 2021-05-11 NOTE — PLAN OF CARE
Plan of Care Review  Plan of Care Reviewed With: patient  Progress: improving  Outcome Summary: Pt sitting in chair most of the day. Pain better controlled with muscle relaxer and pain meds. Ambulated in simon with assist and walker.

## 2021-05-11 NOTE — PROGRESS NOTES
Lumbar Spine Progress Note  Patient:  Otis Mccoy   MRN:      [id number]   :      1950    (LOS: 0 days) Day of Surgery    Subjective    Otis Mccoy is a 71 y.o. male  Status post Procedure(s):  LUMBAR LAMINECTOMY L2-S1.       Otis is doing well.  Reports typical postoperative myofascial pain.  Preop Right Hip Pain is present in PACU    He is free of headaches, Denies New weakness to the lower extremities.   Otis Mccoy DeniesNausea, DeniesVomiting, Denies  Excessive Belching , Denies Hiccups. Denies Fever or Chills.  Denies Chest Pain or Shortness of Breath.    Admits to Appetite and Denies Flatus, No BM during this admission.   Objective     Vitals:    05/10/21 1930 05/10/21 1945 05/10/21 1953 05/10/21 2000   BP:  133/73     Patient Position:       Pulse: 76 76     Resp: 14 (!) 22     Temp:       TempSrc:       SpO2: 99%  97% 98%   Weight:       Height:             Intake/Output Summary (Last 24 hours) at 5/10/2021 2011  Last data filed at 5/10/2021 1945  Gross per 24 hour   Intake 1700 ml   Output 625 ml   Net 1075 ml     Continuous Infusions:  • D5 % and 0.9 % sodium chloride   80 mL/hr (05/10/21 2000)      General appearance: alert, appears stated age and cooperative  Back: symmetric, no curvature. ROM normal. No CVA tenderness.  Abdomen: abnormal findings:  hypoactive bowel sounds  Extremities: extremities normal, warm and well-perfused; no cyanosis, clubbing, or edema  Pulses: 2+ and symmetric  Neurologic: Grossly normal    incision CDI, appropriate drainage on dressing, no erythema no warmth, free of fluctuance or purulence.  minimal TTP   SILT L2-S1  L2-S1 MS/light touch dermatomes intact  No long tract signs or abnormal asymmetric reflexes  Calf Compartments Soft Compressible  Negative for Chay/Calf Compression     Labs                 Lab Results   Component Value Date    TSH 1.95 07/10/2015      Labs      Lines, Drains, Airways, Wounds:  Peripheral IV 05/10/21 Anterior;Left Forearm  Received PA Request from Pharmacy:     Medication: Tretinoin 0.025% cream  Key: [id number]  Plan: [phone]  Patient ID: [id number]      Routed to PA pool for completion.       (Active)   Number of days: 0       Drain 1 Lower Back 15 Fr. (Active)   Number of days: 0       Comments:        LINES, CATHETERS, DRAINS, AIRWAYS, AND WOUNDS   Imaging   Stable at this time   Assessment/Plan    No new Assessment & Plan notes have been filed under this hospital service since the last note was generated.  Service: Orthopedics      71 y.o. y/o male s/p Procedure(s):  LUMBAR LAMINECTOMY L2-S1   Day of Surgery      Plan     1. Pain mgmt: PO Tylenol. PO Oxycodone IR Dilaudid IV             PRN Decadron 10mg x 1 available for persistent right hip pain  2. Bowel Protocol: Senna-S + Miralax  3. VTE Ppx: PCB's, NIRMAL's, No chemical Ppx at this time  4. Activity: OOB       PT Eval pod#0 afternoon if stable. Mobilization w/ PT/OT/Nursing Staff  5. Fluids/Nutrition: IVF Resuscitation until tolerating PO.      Diet:  Advance to Clears until +BS (Gluten Free)  6. Drain: Lumbar Subfascial, cont through pod#2  7. Indwelling Catheter: Placed in OR, continue through pod#1-2  8. Incentive Spirometer 10x/hour  9.  + stopbang ESTRELLA screening,ESTRELLA protocol.  10. cardiology : appreciate recommendations  11. H/o Atrial myxoma-  S/p resection in 2015  12. H/o BPH-  Continue Finasteride and Tamsulosin              Consider D/C When passing Flatus (POD2), Increase PO Intake upon               Removal of briseno   13. h/o Celiac Disease- Gluten Free Diet  14. h/o Smoker- Consider Nicotine patch if difficulty w/ withdrawal.   15. Dispo: Anticipate 2-3 midnights LOS then D/C to home, d/c if stable per *PT/OT/Cards/Medicine, CM consult       Estimated discharge date:    DEANDRE Bolton  5/10/2021  8:11 PM

## 2021-05-11 NOTE — PATIENT CARE CONFERENCE
Care Progression Rounds Note  Date: 5/11/2021  Time: 11:31 AM     Patient Name: Otis Mccoy     Medical Record Number: [id number]   YOB: 1950  Sex: Male      Room/Bed: 0172    Admitting Diagnosis: Lumbar stenosis with neurogenic claudication [M48.062]  Lumbar radiculopathy [M54.16]   Admit Date/Time: 5/10/2021 10:55 AM    Primary Diagnosis: Lumbar stenosis with neurogenic claudication  Principal Problem: Lumbar stenosis with neurogenic claudication    GMLOS: pending  Anticipated Discharge Date: 5/14/2021    AM-PAC:  Mobility Score: 18    Discharge Planning:  Current Living Arrangements: home/apartment/condo  Anticipated Discharge Disposition: family member's home    Barriers to Discharge:  Barriers to Discharge: Test pending, Therapy update pending, Other (see comments)  Comment: Postop day #1    Participants:  advanced practice provider, , nursing, physician, physical therapy, social work/services

## 2021-05-11 NOTE — DISCHARGE INSTR - OTHER ORDERS
[employer] is offering Free smoking cessation classes virtually through [employer].  Please register by calling [phone].

## 2021-05-11 NOTE — OP NOTE
REPORT TYPE: Operative Note    DATE OF OPERATION: 05/10/2021    PREOPERATIVE DIAGNOSES:  Severe multilevel lumbar stenosis L2-L3, L3-L4, L4-L5, L5-S1 with lumbar radiculopathy and neurogenic claudication.    POSTOPERATIVE DIAGNOSES:  Severe multilevel lumbar stenosis L2-L3, L3-L4, L4-L5, L5-S1 with lumbar radiculopathy and neurogenic claudication.    PRINCIPAL PROCEDURES:  1.  Bilateral L2 laminectomy with bilateral partial L1-L2 facetectomies with decompression of the cauda equina and bilateral L2 foraminotomies.  2.  Bilateral L3 laminectomy with bilateral partial L2-L3 facetectomies with decompression of the cauda equina and bilateral L3 foraminotomies.  3.  Bilateral L4 laminectomy with bilateral partial L3-L4 facetectomies with decompression of the cauda equina and bilateral L4 foraminotomies.  4.  Bilateral L5 laminectomy with bilateral partial L4-L5 facetectomies with decompression of the cauda equina and bilateral L5 foraminotomies.  5.  Bilateral L5-S1 partial facetectomies with decompression of the cauda equina and bilateral S1 foraminotomies.    SURGEON:  Benjamin Reyes MD.    ANESTHESIA:  General endotracheal.    INDICATIONS FOR SURGERY AND PREOPERATIVE NOTE:  The patient is a 71-year-old male, intractable lower extremity pain related to lumbar radiculopathy secondary to lumbar stenosis.  Failed nonoperative as well as minimally invasive treatment options,   elected to undergo surgical intervention.  A detailed discussion pertaining to the risks, benefits, outcomes, complications, indications, alternatives related to spinal surgery were reviewed and well delineated in the outpatient record.  Physical exam   and clinical complaints were consistent with the need for surgical intervention.    DETAILS OF THE OPERATION:  The patient was brought back to the operating room, identified as Otis Mccoy, [facility] #[id number].  After successful induction of a general anesthetic and safe placement of a general  endotracheal tube, the patient was positioned   prone on an Abdi frame in a 90/90 hip/knee position.  All bony prominences were padded accordingly.  Posterior lumbar spine was prepped and draped in a standard sterile fashion.  Skin overlying the midline incision was marked and infiltrated with a   dilute solution of epinephrine.  Incision was carried down to the skin down to level of thoracolumbar fascia.  Thoracolumbar fascia was incised and a subperiosteal dissection was carried out.  Intraoperative x-ray confirmed our anatomical location.    A Leksell rongeur was used to remove the spinous processes down to the spinal laminar junction at L2, L3, L4, and L5.  Epidural space was entered at L5-S1 and a central bilateral laminectomy was completed from the L2 through L5 level.  Decompressive   partial facetectomies, L1-L2, L2-L3, L3-L4, L4-L5, L5-S1, effectively decompressing the lateral recess, subarticular and foraminal narrowing.    At the completion of the decompression, laminectomy was capacious, pedicle to pedicle.  Care taken to preserve the pars intraarticularis and facet complexes bilaterally.    Hemostasis was meticulous.  No evidence of CSF egress.    The wound was irrigated copiously, including 1 L of irrigation delivered through a pulsatile lavage.  Vancomycin powder placed subfascial and subcutaneous.  Subfascial drain.  Marcaine injected.  Fascial layer was closed with 1-0 Vicryl suture,   subcutaneous layer with a 2-0 Vicryl, skin was approximated accordingly.      Benjamin Reyes MD    DD: 05/11/2021 06:33  DT: 05/11/2021 06:35  Voice ID: [id number]/Report ID: [id number]  jodi

## 2021-05-11 NOTE — PLAN OF CARE
Problem: Adult Inpatient Plan of Care  Goal: Plan of Care Review  Outcome: Progressing     Problem: Self-Care Deficit  Goal: Improved Ability to Complete Activities of Daily Living  Outcome: Progressing

## 2021-05-11 NOTE — PROGRESS NOTES
Cardiology Daily Progress Note    1. Subjective   No CP and no SOB.    MEDS I reviewed all current medications as listed in the EMR record     Objective       Intake/Output Summary (Last 24 hours) at 5/11/2021 0802  Last data filed at 5/11/2021 0700  Gross per 24 hour   Intake 1850 ml   Output 2005 ml   Net -155 ml     Intake/Output this shift:  I/O this shift:  In: -   Out: 40 [Drains:40]        2. Vital signs in last 24 hours:  Temp:  [36.3 °C (97.4 °F)-36.8 °C (98.3 °F)] 36.3 °C (97.4 °F)  Heart Rate:  [60-82] 60  Resp:  [8-25] 18  BP: (109-183)/(52-88) 109/59    3. Physical Exam:  General Appearance:  Alert, no distress   Head:  Normocephalic, without obvious abnormality, atraumatic   Eyes:  Conjunctiva/corneas clear, EOM's intact   Oral: MMM   Lungs:   Clear to auscultation bilaterally, respirations unlabored, no rales, no wheezing   Heart:  Regular rhythm, S1 and S2 normal, 2/6 systolic murmur   Abdomen:   Soft, non-tender, no masses, no organomegaly   Vascular: Pulses 2+ and symmetric all extremities, no carotid bruit or jugular vein distention   Musculoskeletal:  Skin: No injury or deformity  Skin color, texture, turgor normal, no rashes or lesions, no cyanosis or edema   Extremities: Extremities normal, atraumatic   Behavior/Emotional: Appropriate, cooperative     4. Labs  No new labs     5. Assessment/Plan     1. Post-op follow up s/p LUMBAR LAMINECTOMY L2-S1.:  Continue plan per Dr. Reyes regarding pain control/PT/discharge.  DVT prophylaxis per your protocol.     2.  Current every day smoker Full cessation of all tobacco products will be encouraged to decrease the risk of heart attack, stroke, and death.     • Abnormal electrocardiogram He has a right bundle-branch block and left anterior fascicular block on his pre-op EKG.  This is unchanged compared with prior EKGs.  No further workup is required at this time.     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  We  will do an echo as an outpatient.     Marshall Galdamez MD  5/11/2021

## 2021-05-11 NOTE — PROGRESS NOTES
Patient: Otis Mccoy  Location: [place] [address]  MRN: [id number]  Today's date: 5/11/2021     Pt seated in bedside chair w/ call bell in reach.  All needs met at this time.  Chair alarm activated.    Reviewed spinal prec.    Otis is a 71 y.o. male admitted on 5/10/2021 with Lumbar stenosis with neurogenic claudication [M48.062]  Lumbar radiculopathy [M54.16]. Principal problem is No Principal Problem: There is no principal problem currently on the Problem List. Please update the Problem List and refresh..    Past Medical History  Otis has a past medical history of Anxiety, Arthritis, Celiac disease, Depression, ED (erectile dysfunction), Fatigue, and Numbness.    History of Present Illness   s/p LUMBAR LAMINECTOMY L2-S1       PT Vitals    Date/Time Pulse HR Source SpO2 Pt Activity O2 Therapy [facility]   05/11/21 0825 64 Monitor 96 % At rest None (Room air) [facility]      PT Pain    Date/Time Pain Type Location Rating: Rest Rating: Activity Radiation to Interventions [facility]   05/11/21 0825 Pain Assessment back 8 9 other (see comments) L hip position adjusted [facility]          Prior Living Environment      Most Recent Value   People in Home  alone   Current Living Arrangements  home/apartment/condo   Living Environment Comment  pt lives alone in apartment with 14STE, tub shower          Prior Level of Function      Most Recent Value   Dominant Hand  right   Ambulation  independent   Transferring  independent   Toileting  independent   Bathing  independent   Dressing  independent   Eating  independent   Communication  understands/communicates without difficulty   Assistive Device Currently Used at Home  none          PT Evaluation and Treatment - 05/11/21 0825        PT Time Calculation    Start Time  0825     Stop Time  0848     Time Calculation (min)  23 min        Session Details    Document Type  daily treatment/progress note     Mode of Treatment  physical therapy        General Information    Patient  Profile Reviewed  yes     General Observations of Patient  pt received in bed, agreeable to session     Existing Precautions/Restrictions  fall;spinal        Cognition/Psychosocial    Orientation Status (Cognition)  oriented x 4        Bed Mobility    Comment (Bed Mobility)  pt received OOB, had gotten OOB independently        Sit to Stand Transfer    [place], Sit to Stand Transfer  supervision     Assistive Device  walker, front-wheeled     Comment  from bedside chair x 2        Stand to Sit Transfer    [place], Stand to Sit Transfer  supervision     Assistive Device  walker, front-wheeled        Gait Training    [place], Gait  supervision     Assistive Device  walker, front-wheeled     Distance in Feet  350 feet     Pattern (Gait)  step-through     Deviations/Abnormal Patterns (Gait)  gait speed decreased     Comment (Gait/Stairs)  steady gait w/ RW, notes inc pain in lower L back/hip.        Stairs Training    [place], Stairs  supervision;verbal cues     Assistive Device  railing     Handrail Location (Stairs)  both sides     Number of Stairs  6     Ascending Stairs Technique  step-to-step     Descending Stairs Technique  step-to-step     Comment  cues for sequencing.        Balance    Static Sitting Balance  WFL     Dynamic Sitting Balance  WFL     Sit to Stand Dynamic Balance  WFL     Static Standing Balance  WFL     Dynamic Standing Balance  WFL     Comment, Balance  use of RW        Therapeutic Exercise    Therapeutic Exercise  --    reviewed ankle pumps       AM-PAC (TM) - Mobility (Current Function)    Turning from your back to your side while in a flat bed without using bedrails?  3 - A Little     Moving from lying on your back to sitting on the side of a flat bed without using bedrails?  3 - A Little     Moving to and from a bed to a chair?  3 - A Little     Standing up from a chair using your arms?  3 - A Little     To walk in a hospital room?  3 - A Little     Climbing 3-5 steps with  a railing?  3 - A Little     AM-PAC (TM) Mobility Score  18        Therapy Assessment/Plan (PT)    Rehab Potential (PT)  good, to achieve stated therapy goals     Therapy Frequency (PT)  5-7 times/wk        Progress Summary (PT)    Daily Outcome Statement (PT)  Pt progressing well w/ transfers and mob.  Limited by pain.  Anticipate home when deemed med stable.     Symptoms Noted During/After Treatment  increased pain        Therapy Plan Review/Discharge Plan (PT)    PT Recommended Discharge Disposition  home with assist     Anticipated Equipment Needs at Discharge (PT)  walker, front-wheeled     Therapy Plan Review (PT)  patient                       Education Documentation  Joint Mobility/Strength, taught by Karson Szymanski PTA at 5/11/2021  8:56 AM.  Learner: Patient  Readiness: Acceptance  Method: Explanation  Response: Verbalizes Understanding  Comment: safe transfers and mob w/ RW.    Assistive/Adaptive Devices, taught by Karson Szymanski PTA at 5/11/2021  8:56 AM.  Learner: Patient  Readiness: Acceptance  Method: Explanation  Response: Verbalizes Understanding  Comment: safe transfers and mob w/ RW.          PT Goals      Most Recent Value   Bed Mobility Goal 1   Activity/Assistive Device  bed mobility activities, all at 05/10/2021 1542   [place]  independent at 05/10/2021 1542   Time Frame  by discharge at 05/10/2021 1542   Progress/Outcome  goal ongoing at 05/10/2021 1542   Transfer Goal 1   Activity/Assistive Device  all transfers at 05/10/2021 1542   [place]  independent at 05/10/2021 1542   Time Frame  by discharge at 05/10/2021 1542   Progress/Outcome  goal ongoing at 05/10/2021 1542   Gait Training Goal 1   Activity/Assistive Device  gait (walking locomotion) at 05/10/2021 1542   [place]  independent at 05/10/2021 1542   Distance  250 feet at 05/10/2021 1542   Time Frame  by discharge at 05/10/2021 1542   Progress/Outcome  goal ongoing at 05/10/2021 1542   Stairs Goal 1    Activity/Assistive Device  stairs, all skills at 05/10/2021 1542   [place]  independent at 05/10/2021 1542   Number of Stairs  14 at 05/10/2021 1542   Time Frame  by discharge at 05/10/2021 1542   Progress/Outcome  goal ongoing at 05/10/2021 1542

## 2021-05-11 NOTE — PLAN OF CARE
Plan of Care Review  Plan of Care Reviewed With: patient  Progress: improving  Outcome Summary: patient is c/o pain 10/10 medication given as order. Pt OOB to ambulate in the hallway

## 2021-05-11 NOTE — PROGRESS NOTES
Lumbar Spine Progress Note  Patient:  Otis Mccoy   MRN:      [id number]   :      1950    (LOS: 1 days) 1 Day Post-Op    Subjective    Otis Mccoy is a 71 y.o. male  Status post Procedure(s):  LUMBAR LAMINECTOMY L2-S1.       Otis is doing well.  Reports typical postoperative myofascial pain.  No significant events were reported overnight.       Denies neuropathic pain , is free of headaches, Denies New weakness to the lower extremities.   Otis Mccoy DeniesNausea, DeniesVomiting, Denies  Excessive Belching , Denies Hiccups. Denies Fever or Chills.  Denies Chest Pain or Shortness of Breath.    Admits to Appetite and Admits to Flatus, No BM during this admission.   Objective     Vitals:    21 0336 21 0720 21 0825 21 0901   BP: (!) 109/59 121/81     BP Location: Right upper arm Right upper arm     Patient Position: Lying Sitting     Pulse: 60 63 64 65   Resp: 18 18     Temp: 36.3 °C (97.4 °F) 37.3 °C (99.1 °F)     TempSrc: Oral Oral     SpO2: 95% 96% 96%    Weight:       Height:             Intake/Output Summary (Last 24 hours) at 2021 0948  Last data filed at 2021 0903  Gross per 24 hour   Intake 1850 ml   Output 2235 ml   Net -385 ml     Continuous Infusions:  • D5 % and 0.9 % sodium chloride   80 mL/hr (05/10/21 2000)      General appearance: alert, appears stated age and cooperative  Back: symmetric, no curvature. ROM normal. No CVA tenderness.  Abdomen: soft, non-tender; bowel sounds normal; no masses, no organomegaly  Extremities: extremities normal, warm and well-perfused; no cyanosis, clubbing, or edema  Pulses: 2+ and symmetric  Neurologic: Grossly normal    incision CDI, appropriate drainage on dressing, no erythema no warmth, free of fluctuance or purulence.  minimal TTP   SILT L2-S1  L2-S1 MS/light touch dermatomes intact  No long tract signs or abnormal asymmetric reflexes  Calf Compartments Soft Compressible  Negative for Chay/Calf Compression      Labs                 Lab Results   Component Value Date    TSH 1.95 07/10/2015      Labs      Lines, Drains, Airways, Wounds:  Peripheral IV 05/10/21 Anterior;Left Forearm (Active)   Number of days: 1       Drain 1 Lower Back 15 Fr. (Active)   Number of days: 1       Surgical Incision Back Bilateral (Active)   Number of days: 1       Comments:        LINES, CATHETERS, DRAINS, AIRWAYS, AND WOUNDS   Imaging   Stable at this time   Assessment/Plan    No new Assessment & Plan notes have been filed under this hospital service since the last note was generated.  Service: Orthopedics      71 y.o. y/o male s/p Procedure(s):  LUMBAR LAMINECTOMY L2-S1   1 Day Post-Op      Plan     1. Pain mgmt: PO Tylenol. PO Oxycodone IR Dilaudid IV             PRN Decadron 10mg x 1 available for persistent right hip pain  2. Bowel Protocol: Senna-S + Miralax  3. VTE Ppx: PCB's, NIRMAL's, No chemical Ppx at this time  4. Activity: OOB       PT Eval pod#0 afternoon if stable. Mobilization w/ PT/OT/Nursing Staff  5. Fluids/Nutrition: IVF Resuscitation until tolerating PO.      Diet:  Advance to Clears until +BS (Gluten Free)  6. Drain: Lumbar Subfascial, cont through pod#2  7. Indwelling Catheter: Placed in OR, continue through pod#1-2  8. Incentive Spirometer 10x/hour  9.  + stopbang ESTRELLA screening,ESTRELLA protocol.  10. cardiology : appreciate recommendations  11. H/o Atrial myxoma-  S/p resection in 2015  12. H/o BPH-  Continue Finasteride and Tamsulosin              Consider D/C When passing Flatus (POD2), Increase PO Intake upon               Removal of briseno   13. h/o Celiac Disease- Gluten Free Diet  14. h/o Smoker- Consider Nicotine patch if difficulty w/ withdrawal.   15. Dispo: Anticipate 2-3 midnights LOS then D/C to home, d/c if stable per *PT/OT/Cards/Medicine, CM consult       Estimated discharge date: 5/13/2021   DEANDRE Gómez  5/11/2021  9:48 AM

## 2021-05-12 VITALS
DIASTOLIC BLOOD PRESSURE: 67 MMHG | OXYGEN SATURATION: 94 % | BODY MASS INDEX: 22.38 KG/M2 | HEIGHT: 67 IN | WEIGHT: 142.6 LBS | TEMPERATURE: 98.7 F | SYSTOLIC BLOOD PRESSURE: 144 MMHG | HEART RATE: 78 BPM | RESPIRATION RATE: 18 BRPM

## 2021-05-12 LAB
ATRIAL RATE: 58
P AXIS: 60
PR INTERVAL: 182
QRS DURATION: 158
QT INTERVAL: 474
QTC CALCULATION(BAZETT): 465
R AXIS: -64
T WAVE AXIS: 31
VENTRICULAR RATE: 58

## 2021-05-12 PROCEDURE — 63700000 HC SELF-ADMINISTRABLE DRUG: Performed by: STUDENT IN AN ORGANIZED HEALTH CARE EDUCATION/TRAINING PROGRAM

## 2021-05-12 PROCEDURE — 63600000 HC DRUGS/DETAIL CODE: Performed by: STUDENT IN AN ORGANIZED HEALTH CARE EDUCATION/TRAINING PROGRAM

## 2021-05-12 PROCEDURE — 97116 GAIT TRAINING THERAPY: CPT | Mod: GP,CQ

## 2021-05-12 PROCEDURE — 93010 ELECTROCARDIOGRAM REPORT: CPT | Performed by: INTERNAL MEDICINE

## 2021-05-12 PROCEDURE — 63700000 HC SELF-ADMINISTRABLE DRUG: Performed by: ORTHOPAEDIC SURGERY

## 2021-05-12 PROCEDURE — 97535 SELF CARE MNGMENT TRAINING: CPT | Mod: GO,CO

## 2021-05-12 PROCEDURE — 200200 PR NO CHARGE: Performed by: ORTHOPAEDIC SURGERY

## 2021-05-12 RX ORDER — OXYCODONE HYDROCHLORIDE 5 MG/1
5-10 TABLET ORAL EVERY 4 HOURS PRN
Qty: 60 TABLET | Refills: 0 | Status: SHIPPED | OUTPATIENT
Start: 2021-05-12 | End: 2021-05-17

## 2021-05-12 RX ORDER — CEPHALEXIN 500 MG/1
500 CAPSULE ORAL 3 TIMES DAILY
Qty: 15 CAPSULE | Refills: 0 | Status: SHIPPED | OUTPATIENT
Start: 2021-05-12 | End: 2021-05-17

## 2021-05-12 RX ORDER — TIZANIDINE 2 MG/1
2 TABLET ORAL EVERY 8 HOURS PRN
Qty: 30 TABLET | Refills: 0 | Status: SHIPPED | OUTPATIENT
Start: 2021-05-12 | End: 2021-05-26

## 2021-05-12 RX ADMIN — OXYCODONE HYDROCHLORIDE 10 MG: 5 TABLET ORAL at 13:30

## 2021-05-12 RX ADMIN — TIZANIDINE 2 MG: 2 TABLET ORAL at 05:03

## 2021-05-12 RX ADMIN — OXYCODONE HYDROCHLORIDE 10 MG: 5 TABLET ORAL at 05:03

## 2021-05-12 RX ADMIN — TIZANIDINE 2 MG: 2 TABLET ORAL at 12:23

## 2021-05-12 RX ADMIN — LAMOTRIGINE 200 MG: 200 TABLET ORAL at 12:23

## 2021-05-12 RX ADMIN — DULOXETINE 60 MG: 60 CAPSULE, DELAYED RELEASE ORAL at 09:24

## 2021-05-12 RX ADMIN — OXYCODONE HYDROCHLORIDE 10 MG: 5 TABLET ORAL at 09:41

## 2021-05-12 RX ADMIN — GABAPENTIN 300 MG: 300 CAPSULE ORAL at 09:24

## 2021-05-12 RX ADMIN — CEFAZOLIN SODIUM 1 G: 1 SOLUTION INTRAVENOUS at 05:03

## 2021-05-12 RX ADMIN — ACETAMINOPHEN 975 MG: 325 TABLET, FILM COATED ORAL at 09:24

## 2021-05-12 RX ADMIN — CEFAZOLIN SODIUM 1 G: 1 SOLUTION INTRAVENOUS at 12:23

## 2021-05-12 RX ADMIN — DOCUSATE SODIUM 50 MG AND SENNOSIDES 8.6 MG 1 TABLET: 8.6; 5 TABLET, FILM COATED ORAL at 09:24

## 2021-05-12 RX ADMIN — POLYETHYLENE GLYCOL 3350 17 G: 17 POWDER, FOR SOLUTION ORAL at 09:24

## 2021-05-12 ASSESSMENT — COGNITIVE AND FUNCTIONAL STATUS - GENERAL
HELP NEEDED FOR BATHING: 3 - A LITTLE
DRESSING REGULAR UPPER BODY CLOTHING: 4 - NONE
DRESSING REGULAR LOWER BODY CLOTHING: 3 - A LITTLE
MOVING TO AND FROM BED TO CHAIR: 4 - NONE
HELP NEEDED FOR PERSONAL GROOMING: 4 - NONE
EATING MEALS: 4 - NONE
TOILETING: 3 - A LITTLE
AFFECT: WFL
STANDING UP FROM CHAIR USING ARMS: 4 - NONE
CLIMB 3 TO 5 STEPS WITH RAILING: 3 - A LITTLE
WALKING IN HOSPITAL ROOM: 3 - A LITTLE

## 2021-05-12 NOTE — DISCHARGE SUMMARY
Orthopaedics and Spine at [facility] Discharge Summary    BRIEF OVERVIEW  Patient Information  Name: Otis Mccoy  MRN: [id number]  YOB: 1950  Age: 71 y.o.  Sex: male    Discharge Disposition  Home   Code Status at Discharge: Full Code    Admission Date: 5/10/2021       Discharge Date: 5/12/2021 LOS 2  Admitting Provider: Benjamin Reyes MD  Discharge Provider: Benjamin Reyes MD   Primary Care Physician at Discharge: Leticia Cloud  [phone]     Primary Discharge Diagnosis  Principal problem: Lumbar stenosis with neurogenic claudication    Secondary Discharge Diagnoses  Past Medical History:   Diagnosis Date   • Anxiety     hx of   • Arthritis     bottom of spine   • Celiac disease     hx of   • Depression    • ED (erectile dysfunction)     hx of   • Fatigue     hx of   • Numbness     hx of       ______________________________________________________________________      Allergies:  Gluten  Any new drug reactions or allergies noted during this admission: no  Discharge Medications     Medication List      START taking these medications    acetaminophen 325 mg tablet  Commonly known as: TylenoL  Take 2 tablets (650 mg total) by mouth every 6 (six) hours as needed for mild pain or fever for up to 10 days.  Dose: 650 mg     cephalexin 500 mg capsule  Commonly known as: KEFLEX  Take 1 capsule (500 mg total) by mouth 3 (three) times a day for 5 days.  Dose: 500 mg     oxyCODONE 5 mg immediate release tablet  Commonly known as: ROXICODONE  Take 1-2 tablets (5-10 mg total) by mouth every 4 (four) hours as needed for moderate pain or severe pain for up to 5 days. Cont of Tx  Dose: 5-10 mg     polyethylene glycol 17 gram packet  Commonly known as: MIRALAX  Take 17 g by mouth daily for 3 days.  Dose: 17 g     senna 8.6 mg tablet  Commonly known as: SENOKOT  Take 1 tablet by mouth daily. Purchase OTC Miralax/Senokot.   Take only if necessary for constipation  Dose: 1  tablet     tiZANidine 2 mg tablet  Commonly known as: ZANAFLEX  Take 1 tablet (2 mg total) by mouth every 8 (eight) hours as needed for muscle spasms for up to 14 days.  Dose: 2 mg        CHANGE how you take these medications    ibuprofen 200 mg tablet  Commonly known as: MOTRIN  Start taking on: May 25, 2021  Take 4 tablets (800 mg total) by mouth every 6 (six) hours as needed for mild pain.  Dose: 800 mg  What changed: These instructions start on May 25, 2021. If you are unsure what to do until then, ask your doctor or other care provider.        CONTINUE taking these medications    DULoxetine 60 mg capsule  Commonly known as: CYMBALTA  Take 60 mg by mouth nightly.  Dose: 60 mg     finasteride 5 mg tablet  Commonly known as: PROSCAR  Take 5 mg by mouth daily.  Dose: 5 mg     gabapentin 100 mg capsule  Commonly known as: NEURONTIN  Take 300 mg by mouth 2 (two) times a day.  Dose: 300 mg     lamoTRIgine 100 mg tablet  Commonly known as: LaMICtal  Take 200 mg by mouth daily.  Dose: 200 mg     QUEtiapine 200 mg tablet  Commonly known as: SEROquel  Take 200 mg by mouth nightly.  Dose: 200 mg     tamsulosin 0.4 mg capsule  Commonly known as: FLOMAX  Take 0.4 mg by mouth daily.  Dose: 0.4 mg            Outpatient Follow-Up  Encounter Information    This patient does not currently have any appointments scheduled.         Orders  Diet:        Dietary Orders   (From admission, onward)             Start     Ordered    05/10/21 1444  Adult Diet RD/LDN may adjust order; Regular  Diet effective now     Question Answer Comment   Delegation of Authority. Diet orders written by PA/Melissa may not be adjusted by RD/LDNs. RD/LDN may adjust order    Diet Texture Regular        05/10/21 1443              Activity:        Nursing Activity Orders   (From admission, onward)             Start     Ordered    05/10/21 1444  Ambulate patient  (Assessed at low VTE risk (Caprini score 0-2))  Every shift      05/10/21 1443    Unscheduled  Out of  bed today with assistance  As needed      05/10/21 1443              Wound Care: Incision c/d/i.  DSD until free of drainage.  Then begin gentle cleansing of incision.  OK to leave open to air once (expected) drainage stops.  Call office for persistent drainage >2 days from date of discharge    ______________________________________________________________________    DETAILS OF HOSPITAL STAY  Presenting Problem/History of Present Illness  Lumbar stenosis with neurogenic claudication [M48.062]  Lumbar radiculopathy [M54.16]      Hospital Course by System  Otis Mccoy is a 71 y.o. male   LOS: 2 days) 2 Days Post-Op s/p Procedure(s):  LUMBAR LAMINECTOMY L2-S1      The patient is a 71 y.o. male   Dr. Dr. Reyes recommended Procedure(s):  LUMBAR LAMINECTOMY L2-S1. The patient underwent the procedure on 5/10/2021 and tolerated the procedure well. Details of the procedure can be found in the operative summary.   Otis  received prophylactic antibiotics.  Otis received TEDs and compression boots for DVT prophylaxis.The patient's post-operative course was unremarkable.  Subfascial drain was discontinued when there is less than 90 mL per 8 hours, intravenous antibiotics continued while the   drain was present. The patient was seen postoperatively by cardiology, PT, OT and social work and progressed to the point that on the day of discharge was tolerating a house diet,Otis ROLON was hep locked, voiding without difficulty and ambulating with assistance. At the time of discharge the patient's vital signs were stable with a clean, dry and intact incision.     By 2 days, the patient was ready to go to Home  No Home Health Services .   A final assessment was made of patients readiness for discharge.   Reports being free of headaches, free of new weakness to the extremities and denies  neuropathic/radicular pain.  Counseling for wound and general home care were discussed at length at the bedside. Follow up is anticipated in 2  Weeks for a Laminectomy/Discectomy Outpatient Physical Therapy is not indicated at this time and will be determined after the postoperative appointment.  The patient is encouraged  to resume ADL’s with lifting/ precautions.   Limit transportation by motor vehicle until evaluated postoperatively with Attending Surgeon.  Continue VTE Prophylaxis with Frequent Ambulation DVT PPxNo Chemical Ppx. Continue pain medications, resume reconciled preoperative medications and to follow up with their surgeon. The patient was advised to call the office with any problems including drainage from the incision, redness around the incision, worsening pain not relieved by medication, excessive swelling, bleeding, extremities become cold/blue, tingly or numb, unable to urinate, repeated vomiting and fever greater than 101 degrees F.  If unable to contact physician, go to the emergency department.  Follow up routinely with Leticia Cloud [phone]         Major or Operative Procedures Performed   Procedure(s):  LUMBAR LAMINECTOMY L2-S1  Consults: cardiology  Pertinent Test Results: labs: wnl  Microbiology Results     Procedure Component Value Units Date/Time    COVID-19 PAT/Pre-procedural [[id number]]  (Normal) Collected: 05/05/21 1148    Specimen: Nasopharyngeal Swab from Nasopharynx Updated: 05/05/21 1625    Narrative:      The following orders were created for panel order COVID-19 PAT/Pre-procedural.  Procedure                               Abnormality         Status                     ---------                               -----------         ------                     SARS-CoV-2 (COVID-19), PCR[[id number]]   Normal              Final result                 Please view results for these tests on the individual orders.    SARS-CoV-2 (COVID-19), PCR [[id number]]  (Normal) Collected: 05/05/21 1148    Specimen: Nasopharyngeal Swab from Nasopharynx Updated: 05/05/21 1625     SARS-CoV-2 (COVID-19)  Negative            General Discharge Information:  Problem List:   Patient Active Problem List    Diagnosis Date Noted   • Lumbar stenosis with neurogenic claudication 05/10/2021   • Preop examination 04/29/2021   • History of atrial myxoma 04/29/2021   • BPH (benign prostatic hyperplasia) 04/29/2021   • Bipolar disorder (CMS/HCC) 04/29/2021   • Celiac disease 04/29/2021   • Smoker 04/29/2021   • Poor dentition 04/29/2021   • Lumbar radiculopathy 03/16/2021   • Lumbar stenosis without neurogenic claudication 03/16/2021   • Lumbar facet arthropathy 03/16/2021   • Lumbar degenerative disc disease 03/16/2021   • Other secondary scoliosis, lumbar region 03/16/2021         Plan discussed with Benjamin Reyes MD

## 2021-05-12 NOTE — PLAN OF CARE
Problem: Mobility Impairment  Goal: Optimal Mobility  5/12/2021 1352 by Maame Wei RN  Outcome: Met   Plan of Care Review  Plan of Care Reviewed With: patient  Progress: improving  Outcome Summary: patient resting comfortably in bed. patient continues to take oxycodone with good pain relief. surgical site clean, dry and intact. patient ambulated in the [facility] this morning. safety precautions maintained. will continue to monitor.

## 2021-05-12 NOTE — PLAN OF CARE
Problem: Mobility Impairment  Goal: Optimal Mobility  5/12/2021 1243 by Maame Wei RN  Outcome: Progressing   Plan of Care Review  Plan of Care Reviewed With: patient  Progress: improving  Outcome Summary: patient resting comfortably in bed. patient continues to take oxycodone with good pain relief. surgical site clean, dry and intact. patient ambulated in the [facility] this morning. safety precautions maintained. will continue to monitor.

## 2021-05-12 NOTE — PROGRESS NOTES
Patient: Otis Mccoy  Location: [place] [address]  MRN: [id number]  Today's date: 5/12/2021     Pt seated at EOB at end of session having drain pulled.  RN notified of end of session and pt performance.    Otis is a 71 y.o. male admitted on 5/10/2021 with Lumbar stenosis with neurogenic claudication [M48.062]  Lumbar radiculopathy [M54.16]. Principal problem is No Principal Problem: There is no principal problem currently on the Problem List. Please update the Problem List and refresh..    Past Medical History  Otis has a past medical history of Anxiety, Arthritis, Celiac disease, Depression, ED (erectile dysfunction), Fatigue, and Numbness.    History of Present Illness   s/p LUMBAR LAMINECTOMY L2-S1       PT Vitals    Date/Time Pulse HR Source BP BP Location BP Method Pt Position [facility]   05/12/21 0905 77 Monitor 144/67 Right upper arm Automatic Sitting [facility]      PT Pain    Date/Time Pain Type Location Rating: Rest Rating: Activity Interventions [facility]   05/12/21 0905 Pain Assessment back 7 7 position adjusted [facility]          Prior Living Environment      Most Recent Value   People in Home  alone   Current Living Arrangements  home/apartment/condo   Living Environment Comment  [address], spt, lives alone, raised toilet seat, tub shower          Prior Level of Function      Most Recent Value   Dominant Hand  right   Ambulation  independent   Transferring  independent   Toileting  independent   Bathing  independent   Dressing  independent   Eating  independent   Communication  understands/communicates without difficulty   Prior Level of Function Comment  pt ind func transfers, ind ADLs   Assistive Device Currently Used at Home  none          PT Evaluation and Treatment - 05/12/21 0905        PT Time Calculation    Start Time  0905     Stop Time  0927     Time Calculation (min)  22 min        Session Details    Document Type  daily treatment/progress note     Mode of Treatment  physical therapy         General Information    Patient Profile Reviewed  yes     General Observations of Patient  pt received in bed, agreeable to session     Existing Precautions/Restrictions  fall;spinal        Bed Mobility    [place], Supine to Sit  modified independence     Assistive Device  bed rails        Sit to Stand Transfer    [place], Sit to Stand Transfer  modified independence     Assistive Device  walker, front-wheeled        Stand to Sit Transfer    [place], Stand to Sit Transfer  modified independence     Assistive Device  walker, front-wheeled        Gait Training    [place], Gait  modified independence     Assistive Device  walker, front-wheeled     Distance in Feet  350 feet     Pattern (Gait)  step-through     Deviations/Abnormal Patterns (Gait)  gait speed decreased     Comment (Gait/Stairs)  steady gait w/ RW, no episodes of LOB.        Stairs Training    [place], Stairs  supervision     Assistive Device  railing     Handrail Location (Stairs)  both sides     Number of Stairs  9     Ascending Stairs Technique  step-to-step     Descending Stairs Technique  step-to-step        Balance    Static Sitting Balance  WFL     Dynamic Sitting Balance  WFL     Sit to Stand Dynamic Balance  WFL     Static Standing Balance  WFL     Dynamic Standing Balance  WFL     Comment, Balance  w/ use of RW.        AM-PAC (TM) - Mobility (Current Function)    Turning from your back to your side while in a flat bed without using bedrails?  4 - None     Moving from lying on your back to sitting on the side of a flat bed without using bedrails?  4 - None     Moving to and from a bed to a chair?  4 - None     Standing up from a chair using your arms?  4 - None     To walk in a hospital room?  3 - A Little     Climbing 3-5 steps with a railing?  3 - A Little     AM-PAC (TM) Mobility Score  22        Therapy Assessment/Plan (PT)    Rehab Potential (PT)  good, to achieve stated therapy goals     Therapy Frequency (PT)   5-7 times/wk        Progress Summary (PT)    Daily Outcome Statement (PT)  Pt demonstrated safe transfers, mob w/ RW and stair navigation.  Rec home when deemed med stable.     Symptoms Noted During/After Treatment  none        Therapy Plan Review/Discharge Plan (PT)    PT Recommended Discharge Disposition  home with assist     Anticipated Equipment Needs at Discharge (PT)  walker, front-wheeled    issued and reviewed proper use.    Therapy Plan Review (PT)  patient                  Equipment Provided: Walker, with Wheels, Adult   Vendor: [employer]    Education Documentation  Joint Mobility/Strength, taught by Karson Szymanski PTA at 5/12/2021  9:34 AM.  Learner: Patient  Readiness: Acceptance  Method: Explanation  Response: Verbalizes Understanding  Comment: safe use of RW  stair navigation.    Assistive/Adaptive Devices, taught by Karson Szymanski PTA at 5/12/2021  9:34 AM.  Learner: Patient  Readiness: Acceptance  Method: Explanation  Response: Verbalizes Understanding  Comment: safe use of RW  stair navigation.          PT Goals      Most Recent Value   Bed Mobility Goal 1   Activity/Assistive Device  bed mobility activities, all at 05/10/2021 1542   [place]  independent at 05/10/2021 1542   Time Frame  by discharge at 05/10/2021 1542   Progress/Outcome  goal ongoing at 05/10/2021 1542   Transfer Goal 1   Activity/Assistive Device  all transfers at 05/10/2021 1542   [place]  independent at 05/10/2021 1542   Time Frame  by discharge at 05/10/2021 1542   Progress/Outcome  goal ongoing at 05/10/2021 1542   Gait Training Goal 1   Activity/Assistive Device  gait (walking locomotion) at 05/10/2021 1542   [place]  independent at 05/10/2021 1542   Distance  250 feet at 05/10/2021 1542   Time Frame  by discharge at 05/10/2021 1542   Progress/Outcome  goal ongoing at 05/10/2021 1542   Stairs Goal 1   Activity/Assistive Device  stairs, all skills at 05/10/2021 1542    [place]  independent at 05/10/2021 1542   Number of Stairs  14 at 05/10/2021 1542   Time Frame  by discharge at 05/10/2021 1542   Progress/Outcome  goal ongoing at 05/10/2021 1542

## 2021-05-12 NOTE — PROGRESS NOTES
Lumbar Spine Progress Note  Patient:  Otis Mccoy   MRN:      [id number]   :      1950    (LOS: 2 days) 1 Day Post-Op    Subjective    Otis Mccoy is a 71 y.o. male  Status post Procedure(s):  LUMBAR LAMINECTOMY L2-S1.       Otis is doing well.  Reports typical postoperative myofascial pain.  No significant events were reported overnight.       Denies neuropathic pain , is free of headaches, Denies New weakness to the lower extremities.   Otis Mccoy DeniesNausea, DeniesVomiting, Denies  Excessive Belching , Denies Hiccups. Denies Fever or Chills.  Denies Chest Pain or Shortness of Breath.    Admits to Appetite and Admits to Flatus, No BM during this admission.   Objective     Vitals:    21 2300 21 0728 21 0905 21 0950   BP: 132/71 130/70 (!) 144/67    BP Location: Right upper arm  Right upper arm    Patient Position: Sitting  Sitting    Pulse: 62 76 77 78   Resp: 16 18     Temp: 36.7 °C (98 °F) 37.1 °C (98.7 °F)     TempSrc: Oral Oral     SpO2: 97% 94%     Weight:       Height:             Intake/Output Summary (Last 24 hours) at 2021 1247  Last data filed at 2021 0503  Gross per 24 hour   Intake --   Output 1450 ml   Net -1450 ml     Continuous Infusions:     General appearance: alert, appears stated age and cooperative  Back: symmetric, no curvature. ROM normal. No CVA tenderness.  Abdomen: soft, non-tender; bowel sounds normal; no masses, no organomegaly  Extremities: extremities normal, warm and well-perfused; no cyanosis, clubbing, or edema  Pulses: 2+ and symmetric  Neurologic: Grossly normal    incision CDI, appropriate drainage on dressing, no erythema no warmth, free of fluctuance or purulence.  minimal TTP   SILT L2-S1  L2-S1 MS/light touch dermatomes intact  No long tract signs or abnormal asymmetric reflexes  Calf Compartments Soft Compressible  Negative for Chay/Calf Compression     Labs                 Lab Results   Component Value Date    TSH  1.95 07/10/2015      Labs      Lines, Drains, Airways, Wounds:  Peripheral IV 05/10/21 Anterior;Left Forearm (Active)   Number of days: 1       Drain 1 Lower Back 15 Fr. (Active)   Number of days: 1       Surgical Incision Back Bilateral (Active)   Number of days: 1       Comments:        LINES, CATHETERS, DRAINS, AIRWAYS, AND WOUNDS   Imaging   Stable at this time   Assessment/Plan    No new Assessment & Plan notes have been filed under this hospital service since the last note was generated.  Service: Orthopedics      71 y.o. y/o male s/p Procedure(s):  LUMBAR LAMINECTOMY L2-S1   1 Day Post-Op      Plan     1. Pain mgmt: PO Tylenol. PO Oxycodone IR Dilaudid IV             PRN Decadron 10mg x 1 available for persistent right hip pain  2. Bowel Protocol: Senna-S + Miralax  3. VTE Ppx: PCB's, NIRMAL's, No chemical Ppx at this time  4. Activity: OOB       PT Eval pod#0 afternoon if stable. Mobilization w/ PT/OT/Nursing Staff  5. Fluids/Nutrition: IVR      Diet:  Regular  6. Drain: Lumbar Subfascial, d/c pod#2  7. Indwelling Catheter: Placed in OR, d/c pod#1  8. Incentive Spirometer 10x/hour  9.  + stopbang ESTRELLA screening,ESTRELLA protocol.  10. cardiology : appreciate recommendations  11. H/o Atrial myxoma-  S/p resection in 2015  12. H/o BPH-  Continue Finasteride and Tamsulosin              Consider D/C When passing Flatus (POD2), Increase PO Intake upon               Removal of briseno   13. h/o Celiac Disease- Gluten Free Diet  14. h/o Smoker- Consider Nicotine patch if difficulty w/ withdrawal.   15. Dispo:  D/C to home, d/c if stable per *PT/OT/Cards/Medicine, CM consult       Estimated discharge date: 5/12/2021   [place] Bolton  5/12/2021  12:47 PM

## 2021-05-12 NOTE — PLAN OF CARE
Problem: Mobility Impairment  Goal: Optimal Mobility  Outcome: Progressing   Plan of Care Review  Plan of Care Reviewed With: patient  Outcome Summary: patient resting comfortably in bed. patient continues to take oxycodone with good pain relief. surgical site clean, dry and intact. patient ambulated in the [facility] PT this morning. safety precautions maintained. will continue to monitor.

## 2021-05-12 NOTE — PLAN OF CARE
Plan of Care Review  Plan of Care Reviewed With: patient  Progress: improving  Outcome Summary: Pt pain well controlled through the night, sleep/rest promoted, surgical site clean dry intact, pt ambulated to bathroom and around room multiple times assistx1 with walker, safety precautions maintained

## 2021-05-12 NOTE — PROGRESS NOTES
Patient: Otis Mccoy  Location: [place] [address]  MRN: [id number]  Today's date: 5/12/2021  Pt left in chr with call ynes Berg is a 71 y.o. male admitted on 5/10/2021 with Lumbar stenosis with neurogenic claudication [M48.062]  Lumbar radiculopathy [M54.16]. Principal problem is No Principal Problem: There is no principal problem currently on the Problem List. Please update the Problem List and refresh..    Past Medical History  Otis has a past medical history of Anxiety, Arthritis, Celiac disease, Depression, ED (erectile dysfunction), Fatigue, and Numbness.    History of Present Illness   s/p LUMBAR LAMINECTOMY L2-S1       OT Vitals    Date/Time Pulse HR Source [facility]   05/12/21 0950 78 Monitor MB      OT Pain    Date/Time Pain Type Rating: Rest Rating: Activity Interventions [facility]   05/12/21 0950 Pain Assessment 8 8 premedicated for activity MB          Prior Living Environment      Most Recent Value   People in Home  alone   Current Living Arrangements  home/apartment/condo   Living Environment Comment  [address], spt, lives alone, raised toilet seat, tub shower          Prior Level of Function      Most Recent Value   Dominant Hand  right   Ambulation  independent   Transferring  independent   Toileting  independent   Bathing  independent   Dressing  independent   Eating  independent   Communication  understands/communicates without difficulty   Prior Level of Function Comment  pt ind func transfers, ind ADLs   Assistive Device Currently Used at Home  none              OT Evaluation and Treatment - 05/12/21 0950        OT Time Calculation    Start Time  0950     Stop Time  1002     Time Calculation (min)  12 min        Session Details    Document Type  daily treatment/progress note     Mode of Treatment  occupational therapy        General Information    Patient Profile Reviewed  yes     General Observations of Patient  Pt rec'd walking in room     Existing Precautions/Restrictions   fall;spinal        Cognition/Psychosocial    Affect/Mental Status (Cognition)  WFL     Orientation Status (Cognition)  oriented x 4        Transfers    Transfers  toilet transfer;shower transfer;car transfer        Sit to Stand Transfer    [place], Sit to Stand Transfer  supervision;verbal cues     Verbal Cues  hand placement     Assistive Device  walker, front-wheeled        Stand to Sit Transfer    [place], Stand to Sit Transfer  modified independence     Assistive Device  walker, front-wheeled        Toilet Transfer    [place], Toilet Transfer  modified independence     Assistive Device  grab bars/safety frame     Comment  reg toilet height at family's house however will have windowsill         Shower Transfer    Comment  going to family's haouse with stall shr         Car Transfer    Comment  Pt ed with therapist refugio on car txfers        Lower Body Dressing    Tasks  doff;don;socks     [place]  supervision     Position  supported sitting     Comment  crossed leg method         Toileting    [place]  adjust/manage clothing;perform bladder hygiene;modified independence     Position  supported standing     Adaptive Equipment  urinal        Coping    Observed Emotional State  cooperative     Verbalized Emotional State  acceptance        AM-PAC (TM) - ADL (Current Function)    Putting on and taking off regular lower body clothing?  3 - A Little     Bathing?  3 - A Little     Toileting?  3 - A Little     Putting on/taking off regular upper body clothing?  4 - None     How much help for taking care of personal grooming?  4 - None     Eating meals?  4 - None     AM-PAC (TM) ADL Score  21        Therapy Assessment/Plan (OT)    Rehab Potential (OT)  good, to achieve stated therapy goals     Therapy Frequency (OT)  5 times/wk        Progress Summary (OT)    Daily Outcome Statement (OT)  Pt shows progress in ADL tasks.  Progress with functional txfers.  Review of spinal prec with activity.  Rec home  with assist when medically stable.          Therapy Plan Review/Discharge Plan (OT)    OT Recommended Discharge Disposition  home with assist;home with home health     Anticipated Equipment Needs At Discharge (OT)  reacher    pt has RTS                 Education Documentation  Home Safety, taught by Grace King COTA at 5/12/2021 10:22 AM.  Learner: Patient  Readiness: Acceptance  Method: Explanation, Demonstration  Response: Verbalizes Understanding, Demonstrated Understanding  Comment: Pt ed on spinal prec with ADL tasks.               OT Goals      Most Recent Value   Transfer Goal 1   Activity/Assistive Device  all transfers at 05/11/2021 0901   [place]  modified independence at 05/11/2021 0901   Time Frame  by discharge at 05/11/2021 0901   Progress/Outcome  goal ongoing at 05/11/2021 0901   Bathing Goal 1   Activity/Assistive Device  bathing skills, all at 05/11/2021 0901   [place]  modified independence at 05/11/2021 0901   Time Frame  by discharge at 05/11/2021 0901   Progress/Outcome  goal ongoing at 05/11/2021 0901   Dressing Goal 1   Activity/Adaptive Equipment  dressing skills, all at 05/11/2021 0901   [place]  modified independence at 05/11/2021 0901   Time Frame  by discharge at 05/11/2021 0901   Progress/Outcome  goal ongoing at 05/11/2021 0901   Toileting Goal 1   Activity/Assistive Device  toileting skills, all at 05/11/2021 0901   [place]  modified independence at 05/11/2021 0901   Time Frame  by discharge at 05/11/2021 0901   Progress/Outcome  goal ongoing at 05/11/2021 0901

## 2021-05-17 RX ORDER — OXYCODONE HYDROCHLORIDE 5 MG/1
5-10 TABLET ORAL EVERY 4 HOURS PRN
Qty: 60 TABLET | Refills: 0 | Status: SHIPPED | OUTPATIENT
Start: 2021-05-17 | End: 2021-05-22

## 2021-05-17 NOTE — PROGRESS NOTES
Med Refill    OK to shower, s/w with Stephanie..ok to travel by car to his home in [place] to check on the property.  Patient aware to not drive until follow up

## 2021-05-25 ENCOUNTER — OFFICE VISIT (OUTPATIENT)
Dept: ORTHOPEDICS | Facility: CLINIC | Age: 71
End: 2021-05-25
Payer: MEDICARE

## 2021-05-25 ENCOUNTER — HOSPITAL ENCOUNTER (OUTPATIENT)
Dept: RADIOLOGY | Facility: HOSPITAL | Age: 71
Discharge: HOME | End: 2021-05-25
Attending: PHYSICIAN ASSISTANT
Payer: MEDICARE

## 2021-05-25 VITALS — HEIGHT: 67 IN | WEIGHT: 142 LBS | BODY MASS INDEX: 22.29 KG/M2

## 2021-05-25 DIAGNOSIS — Z98.890 S/P LAMINECTOMY: Primary | ICD-10-CM

## 2021-05-25 DIAGNOSIS — M51.369 LUMBAR DEGENERATIVE DISC DISEASE: ICD-10-CM

## 2021-05-25 PROBLEM — M54.16 LUMBAR RADICULOPATHY: Status: RESOLVED | Noted: 2021-03-16 | Resolved: 2021-05-25

## 2021-05-25 PROBLEM — M41.56 OTHER SECONDARY SCOLIOSIS, LUMBAR REGION: Status: RESOLVED | Noted: 2021-03-16 | Resolved: 2021-05-25

## 2021-05-25 PROBLEM — M48.061 LUMBAR STENOSIS WITHOUT NEUROGENIC CLAUDICATION: Status: RESOLVED | Noted: 2021-03-16 | Resolved: 2021-05-25

## 2021-05-25 PROBLEM — M47.816 LUMBAR FACET ARTHROPATHY: Status: RESOLVED | Noted: 2021-03-16 | Resolved: 2021-05-25

## 2021-05-25 PROBLEM — M48.062 LUMBAR STENOSIS WITH NEUROGENIC CLAUDICATION: Status: RESOLVED | Noted: 2021-05-10 | Resolved: 2021-05-25

## 2021-05-25 PROCEDURE — 72100 X-RAY EXAM L-S SPINE 2/3 VWS: CPT

## 2021-05-25 PROCEDURE — 99024 POSTOP FOLLOW-UP VISIT: CPT | Performed by: ORTHOPAEDIC SURGERY

## 2021-05-25 NOTE — PROGRESS NOTES
"ORTHO SPINE ESTABLISHED PATIENT      Otis Mccoy       HPI: Returns postoperatively doing quite well.  All of his presurgical leg pain is resolved.  Denies any mechanical lower back pain complaints.  Denies any incisional complaints    PHYSICAL EXAM :   Visit Vitals  Ht 1.702 m (5' 7\")   Wt 64.4 kg (142 lb)   BMI 22.24 kg/m²       Incision is well-healed.  Posture is neutral.  Motor testing lower extremities 5/5      Imaging : Personal view x-rays lumbar spine outline a wide laminectomy defect with no progression.  There is no coronal or sagittal deformity progression stable compared to his preoperative radiographs.    No evidence of scoliosis progression no post laminectomy spondylolisthesis wide pedicle to pedicle laminectomy    X-ray report reviewed    IMPRESSION : 1.  Status post multilevel lumbar laminectomy    PLAN : Recommendations for instituting physical therapy course were outlined.    Postsurgically he is doing quite well.  There is really no limitations from the standpoint of returning to his activities and a slow methodical process.      Benjamin Reyes MD   5/25/2021  11:13 AM  "

## 2022-01-13 ENCOUNTER — ESTABLISHED COMPREHENSIVE EXAM (OUTPATIENT)
Dept: URBAN - METROPOLITAN AREA CLINIC 100 | Facility: CLINIC | Age: 72
End: 2022-01-13

## 2022-01-13 DIAGNOSIS — Z96.1: ICD-10-CM

## 2022-01-13 DIAGNOSIS — H53.133: ICD-10-CM

## 2022-01-13 DIAGNOSIS — H18.892: ICD-10-CM

## 2022-01-13 DIAGNOSIS — H35.342: ICD-10-CM

## 2022-01-13 PROCEDURE — 99213 OFFICE O/P EST LOW 20 MIN: CPT

## 2022-01-13 PROCEDURE — 92134 CPTRZ OPH DX IMG PST SGM RTA: CPT | Mod: NC

## 2022-01-13 ASSESSMENT — KERATOMETRY
OD_K1POWER_DIOPTERS: 44.50
OS_AXISANGLE_DEGREES: 7
OS_K1POWER_DIOPTERS: 44.50
OS_AXISANGLE2_DEGREES: 97
OS_K2POWER_DIOPTERS: 45.25
OD_AXISANGLE2_DEGREES: 64
OD_AXISANGLE_DEGREES: 154
OD_K2POWER_DIOPTERS: 45.00

## 2022-01-13 ASSESSMENT — VISUAL ACUITY
OS_SC: 20/50+1
OD_PH: 20/50-2
OD_SC: 20/100+1
OS_SC: J7
OD_SC: J7

## 2022-01-14 ENCOUNTER — DIAGNOSTICS ONLY (OUTPATIENT)
Dept: URBAN - METROPOLITAN AREA CLINIC 100 | Facility: CLINIC | Age: 72
End: 2022-01-14

## 2022-01-14 DIAGNOSIS — H53.133: ICD-10-CM

## 2022-01-14 PROCEDURE — 92083 EXTENDED VISUAL FIELD XM: CPT

## 2022-01-14 ASSESSMENT — KERATOMETRY
OS_AXISANGLE2_DEGREES: 97
OD_K1POWER_DIOPTERS: 44.50
OS_K2POWER_DIOPTERS: 45.25
OS_K1POWER_DIOPTERS: 44.50
OS_AXISANGLE_DEGREES: 7
OD_K2POWER_DIOPTERS: 45.00
OD_AXISANGLE_DEGREES: 154
OD_AXISANGLE2_DEGREES: 64

## 2022-01-20 ENCOUNTER — TRANSCRIBE ORDERS (OUTPATIENT)
Dept: NEUROLOGY | Facility: HOSPITAL | Age: 72
End: 2022-01-20

## 2022-01-20 ENCOUNTER — TRANSCRIBE ORDERS (OUTPATIENT)
Dept: SCHEDULING | Age: 72
End: 2022-01-20

## 2022-01-20 ENCOUNTER — APPOINTMENT (OUTPATIENT)
Dept: LAB | Facility: HOSPITAL | Age: 72
End: 2022-01-20
Attending: PSYCHIATRY & NEUROLOGY
Payer: MEDICARE

## 2022-01-20 DIAGNOSIS — D64.9 ANEMIA, UNSPECIFIED: ICD-10-CM

## 2022-01-20 DIAGNOSIS — R53.82 CHRONIC FATIGUE, UNSPECIFIED: ICD-10-CM

## 2022-01-20 DIAGNOSIS — R41.3 OTHER AMNESIA: Primary | ICD-10-CM

## 2022-01-20 DIAGNOSIS — M25.50 PAIN IN UNSPECIFIED JOINT: Primary | ICD-10-CM

## 2022-01-20 DIAGNOSIS — R41.3 OTHER AMNESIA: ICD-10-CM

## 2022-01-20 DIAGNOSIS — M25.50 PAIN IN UNSPECIFIED JOINT: ICD-10-CM

## 2022-01-20 LAB
ALBUMIN SERPL-MCNC: 3.4 G/DL (ref 3.4–5)
ALP SERPL-CCNC: 75 IU/L (ref 35–126)
ALT SERPL-CCNC: 18 IU/L (ref 16–63)
ANION GAP SERPL CALC-SCNC: 9 MEQ/L (ref 3–15)
AST SERPL-CCNC: 20 IU/L (ref 15–41)
BILIRUB SERPL-MCNC: 0.5 MG/DL (ref 0.3–1.2)
BUN SERPL-MCNC: 8 MG/DL (ref 8–20)
CALCIUM SERPL-MCNC: 8.9 MG/DL (ref 8.9–10.3)
CHLORIDE SERPL-SCNC: 109 MEQ/L (ref 98–109)
CO2 SERPL-SCNC: 21 MEQ/L (ref 22–32)
CREAT SERPL-MCNC: 1 MG/DL (ref 0.8–1.3)
CRP SERPL-MCNC: 20.82 MG/L
ERYTHROCYTE [DISTWIDTH] IN BLOOD BY AUTOMATED COUNT: 15.7 % (ref 11.6–14.4)
ERYTHROCYTE [SEDIMENTATION RATE] IN BLOOD BY WESTERGREN METHOD: 37 MM/HR
FERRITIN SERPL-MCNC: 30 NG/ML (ref 24–250)
GFR SERPL CREATININE-BSD FRML MDRD: >60 ML/MIN/1.73M*2
GLUCOSE SERPL-MCNC: 85 MG/DL (ref 70–99)
HCT VFR BLDCO AUTO: 38.6 % (ref 40.1–51)
HGB BLD-MCNC: 11.9 G/DL (ref 13.7–17.5)
IRON SATN MFR SERPL: 11 % (ref 15–45)
IRON SERPL-MCNC: 35 UG/DL (ref 35–150)
MCH RBC QN AUTO: 26.7 PG (ref 28–33.2)
MCHC RBC AUTO-ENTMCNC: 30.8 G/DL (ref 32.2–36.5)
MCV RBC AUTO: 86.7 FL (ref 83–98)
PDW BLD AUTO: 11.7 FL (ref 9.4–12.4)
PLATELET # BLD AUTO: 213 K/UL (ref 150–350)
POTASSIUM SERPL-SCNC: 4.1 MEQ/L (ref 3.6–5.1)
PROT SERPL-MCNC: 5.9 G/DL (ref 6–8.2)
RBC # BLD AUTO: 4.45 M/UL (ref 4.5–5.8)
SODIUM SERPL-SCNC: 139 MEQ/L (ref 136–144)
T4 FREE SERPL-MCNC: 0.67 NG/DL (ref 0.58–1.64)
TIBC SERPL-MCNC: 312 UG/DL (ref 270–460)
TSH SERPL DL<=0.05 MIU/L-ACNC: 4.4 MIU/L (ref 0.34–5.6)
UIBC SERPL-MCNC: 277 UG/DL (ref 180–360)
VIT B12 SERPL-MCNC: 278 PG/ML (ref 180–914)
WBC # BLD AUTO: 7.51 K/UL (ref 3.8–10.5)

## 2022-01-20 PROCEDURE — 85652 RBC SED RATE AUTOMATED: CPT

## 2022-01-20 PROCEDURE — 82728 ASSAY OF FERRITIN: CPT

## 2022-01-20 PROCEDURE — 84443 ASSAY THYROID STIM HORMONE: CPT

## 2022-01-20 PROCEDURE — 86140 C-REACTIVE PROTEIN: CPT

## 2022-01-20 PROCEDURE — 82607 VITAMIN B-12: CPT

## 2022-01-20 PROCEDURE — 83921 ORGANIC ACID SINGLE QUANT: CPT

## 2022-01-20 PROCEDURE — 83540 ASSAY OF IRON: CPT

## 2022-01-20 PROCEDURE — 84439 ASSAY OF FREE THYROXINE: CPT

## 2022-01-20 PROCEDURE — 80053 COMPREHEN METABOLIC PANEL: CPT

## 2022-01-20 PROCEDURE — 36415 COLL VENOUS BLD VENIPUNCTURE: CPT

## 2022-01-20 PROCEDURE — 85027 COMPLETE CBC AUTOMATED: CPT

## 2022-01-24 ENCOUNTER — HOSPITAL ENCOUNTER (OUTPATIENT)
Dept: RADIOLOGY | Age: 72
Discharge: HOME | End: 2022-01-24
Attending: PSYCHIATRY & NEUROLOGY
Payer: MEDICARE

## 2022-01-24 DIAGNOSIS — R41.3 OTHER AMNESIA: ICD-10-CM

## 2022-01-25 ENCOUNTER — APPOINTMENT (OUTPATIENT)
Dept: LAB | Facility: HOSPITAL | Age: 72
End: 2022-01-25
Attending: INTERNAL MEDICINE
Payer: MEDICARE

## 2022-01-25 ENCOUNTER — TRANSCRIBE ORDERS (OUTPATIENT)
Dept: SCHEDULING | Age: 72
End: 2022-01-25

## 2022-01-25 DIAGNOSIS — M79.10 MYALGIA: ICD-10-CM

## 2022-01-25 DIAGNOSIS — R53.82 CHRONIC FATIGUE, UNSPECIFIED: ICD-10-CM

## 2022-01-25 DIAGNOSIS — M79.10 MYALGIA: Primary | ICD-10-CM

## 2022-01-25 LAB
CK SERPL-CCNC: 183 U/L (ref 16–300)
METHYLMALONATE SERPL-SCNC: 104 NMOL/L (ref 87–318)

## 2022-01-25 PROCEDURE — 86038 ANTINUCLEAR ANTIBODIES: CPT

## 2022-01-25 PROCEDURE — 86618 LYME DISEASE ANTIBODY: CPT

## 2022-01-25 PROCEDURE — 36415 COLL VENOUS BLD VENIPUNCTURE: CPT

## 2022-01-25 PROCEDURE — 84165 PROTEIN E-PHORESIS SERUM: CPT

## 2022-01-25 PROCEDURE — 86334 IMMUNOFIX E-PHORESIS SERUM: CPT

## 2022-01-25 PROCEDURE — 82550 ASSAY OF CK (CPK): CPT

## 2022-01-26 ENCOUNTER — LAB REQUISITION (OUTPATIENT)
Dept: LAB | Facility: HOSPITAL | Age: 72
End: 2022-01-26
Payer: MEDICARE

## 2022-01-26 DIAGNOSIS — D64.9 ANEMIA, UNSPECIFIED: ICD-10-CM

## 2022-01-26 LAB
ALBUMIN MFR UR ELPH: 62.8 %
ALBUMIN SERPL ELPH-MCNC: 3.96 G/DL (ref 3.2–4.9)
ALBUMIN SERPL-MCNC: 3.7 G/DL (ref 3.4–5)
ALBUMIN/GLOB SERPL: 1.7 {RATIO} (ref 1.1–2.1)
ALP SERPL-CCNC: 72 IU/L (ref 35–126)
ALPHA1 GLOB MFR SERPL ELPH: 2.9 %
ALPHA1 GLOB SERPL ELPH-MCNC: 0.18 G/DL (ref 0.08–0.23)
ALPHA2 GLOB MFR UR ELPH: 10.6 %
ALPHA2 GLOB SERPL ELPH-MCNC: 0.67 G/DL (ref 0.45–0.92)
ALT SERPL-CCNC: 20 IU/L (ref 16–63)
ANA SER QL HEP2 SUBST: NEGATIVE
ANION GAP SERPL CALC-SCNC: 9 MEQ/L (ref 3–15)
AST SERPL-CCNC: 22 IU/L (ref 15–41)
B BURGDOR AB SER IA-ACNC: 0.09 RATIO
B-GLOBULIN SERPL ELPH-MCNC: 0.6 G/DL (ref 0.5–1.03)
BASOPHILS # BLD: 0.04 K/UL (ref 0.01–0.1)
BASOPHILS NFR BLD: 0.6 %
BETA1 GLOB MFR UR ELPH: 9.5 %
BILIRUB SERPL-MCNC: 0.5 MG/DL (ref 0.3–1.2)
BUN SERPL-MCNC: 21 MG/DL (ref 8–20)
CALCIUM SERPL-MCNC: 9 MG/DL (ref 8.9–10.3)
CHLORIDE SERPL-SCNC: 105 MEQ/L (ref 98–109)
CO2 SERPL-SCNC: 27 MEQ/L (ref 22–32)
CREAT SERPL-MCNC: 0.9 MG/DL (ref 0.8–1.3)
DIFFERENTIAL METHOD BLD: ABNORMAL
EOSINOPHIL # BLD: 0.18 K/UL (ref 0.04–0.54)
EOSINOPHIL NFR BLD: 2.7 %
ERYTHROCYTE [DISTWIDTH] IN BLOOD BY AUTOMATED COUNT: 15.2 % (ref 11.6–14.4)
FERRITIN SERPL-MCNC: 28 NG/ML (ref 24–250)
FOLATE SERPL-MCNC: 8.7 NG/ML
GAMMA GLOB MFR UR ELPH: 14.2 %
GAMMA GLOB SERPL ELPH-MCNC: 0.89 G/DL (ref 0.6–1.6)
GFR SERPL CREATININE-BSD FRML MDRD: >60 ML/MIN/1.73M*2
GLUCOSE SERPL-MCNC: 77 MG/DL (ref 70–99)
HCT VFR BLDCO AUTO: 36.9 % (ref 40.1–51)
HGB BLD-MCNC: 11.6 G/DL (ref 13.7–17.5)
IMM GRANULOCYTES # BLD AUTO: 0.02 K/UL (ref 0–0.08)
IMM GRANULOCYTES NFR BLD AUTO: 0.3 %
IRON SATN MFR SERPL: 9 % (ref 15–45)
IRON SERPL-MCNC: 29 UG/DL (ref 35–150)
LYMPHOCYTES # BLD: 1.54 K/UL (ref 1.2–3.5)
LYMPHOCYTES NFR BLD: 23 %
M PROTEIN MFR SERPL ELPH: 5.6 %
M PROTEIN SERPL ELPH-MCNC: 0.35 G/DL
MCH RBC QN AUTO: 26.8 PG (ref 28–33.2)
MCHC RBC AUTO-ENTMCNC: 31.4 G/DL (ref 32.2–36.5)
MCV RBC AUTO: 85.2 FL (ref 83–98)
MONOCYTES # BLD: 0.45 K/UL (ref 0.3–1)
MONOCYTES NFR BLD: 6.7 %
NEUTROPHILS # BLD: 4.48 K/UL (ref 1.7–7)
NEUTROPHILS # BLD: 4.48 K/UL (ref 1.7–7)
NEUTS SEG NFR BLD: 66.7 %
NRBC BLD-RTO: 0 %
PDW BLD AUTO: 10.9 FL (ref 9.4–12.4)
PLATELET # BLD AUTO: 248 K/UL (ref 150–350)
POTASSIUM SERPL-SCNC: 4.6 MEQ/L (ref 3.6–5.1)
PROT PATTERN SERPL ELPH-IMP: NORMAL
PROT SERPL-MCNC: 6.3 G/DL (ref 6–8.2)
PROT SERPL-MCNC: 6.6 G/DL (ref 6–8.2)
RBC # BLD AUTO: 4.33 M/UL (ref 4.5–5.8)
SODIUM SERPL-SCNC: 141 MEQ/L (ref 136–144)
TIBC SERPL-MCNC: 330 UG/DL (ref 270–460)
UIBC SERPL-MCNC: 301 UG/DL (ref 180–360)
VIT B12 SERPL-MCNC: >1500 PG/ML (ref 180–914)
WBC # BLD AUTO: 6.71 K/UL (ref 3.8–10.5)

## 2022-01-26 PROCEDURE — 36415 COLL VENOUS BLD VENIPUNCTURE: CPT | Performed by: INTERNAL MEDICINE

## 2022-01-26 PROCEDURE — 85025 COMPLETE CBC W/AUTO DIFF WBC: CPT | Performed by: INTERNAL MEDICINE

## 2022-01-26 PROCEDURE — 82607 VITAMIN B-12: CPT | Mod: GZ | Performed by: INTERNAL MEDICINE

## 2022-01-26 PROCEDURE — 83550 IRON BINDING TEST: CPT | Performed by: INTERNAL MEDICINE

## 2022-01-26 PROCEDURE — 80053 COMPREHEN METABOLIC PANEL: CPT | Performed by: INTERNAL MEDICINE

## 2022-01-26 PROCEDURE — 82746 ASSAY OF FOLIC ACID SERUM: CPT | Performed by: INTERNAL MEDICINE

## 2022-01-26 PROCEDURE — 82728 ASSAY OF FERRITIN: CPT | Performed by: INTERNAL MEDICINE

## 2022-01-27 LAB — INTERPRETATION SERPL IFE-IMP: NORMAL

## 2022-05-12 ENCOUNTER — TRANSCRIBE ORDERS (OUTPATIENT)
Dept: SCHEDULING | Age: 72
End: 2022-05-12

## 2022-05-12 ENCOUNTER — APPOINTMENT (OUTPATIENT)
Dept: LAB | Facility: HOSPITAL | Age: 72
End: 2022-05-12
Attending: INTERNAL MEDICINE
Payer: MEDICARE

## 2022-05-12 DIAGNOSIS — D50.9 IRON DEFICIENCY ANEMIA, UNSPECIFIED: ICD-10-CM

## 2022-05-12 DIAGNOSIS — D50.9 IRON DEFICIENCY ANEMIA, UNSPECIFIED: Primary | ICD-10-CM

## 2022-05-12 DIAGNOSIS — D64.9 ANEMIA, UNSPECIFIED: ICD-10-CM

## 2022-05-12 DIAGNOSIS — D47.2 MONOCLONAL GAMMOPATHY: ICD-10-CM

## 2022-05-12 LAB
ALBUMIN SERPL-MCNC: 4.1 G/DL (ref 3.4–5)
ALP SERPL-CCNC: 133 IU/L (ref 35–126)
ALT SERPL-CCNC: 46 IU/L (ref 16–63)
ANION GAP SERPL CALC-SCNC: 7 MEQ/L (ref 3–15)
AST SERPL-CCNC: 36 IU/L (ref 15–41)
BASOPHILS # BLD: 0.06 K/UL (ref 0.01–0.1)
BASOPHILS NFR BLD: 0.8 %
BILIRUB SERPL-MCNC: 0.5 MG/DL (ref 0.3–1.2)
BUN SERPL-MCNC: 22 MG/DL (ref 8–20)
CALCIUM SERPL-MCNC: 9.3 MG/DL (ref 8.9–10.3)
CHLORIDE SERPL-SCNC: 104 MEQ/L (ref 98–109)
CO2 SERPL-SCNC: 26 MEQ/L (ref 22–32)
CREAT SERPL-MCNC: 1 MG/DL (ref 0.8–1.3)
DIFFERENTIAL METHOD BLD: ABNORMAL
EOSINOPHIL # BLD: 0.28 K/UL (ref 0.04–0.54)
EOSINOPHIL NFR BLD: 3.9 %
ERYTHROCYTE [DISTWIDTH] IN BLOOD BY AUTOMATED COUNT: 14.5 % (ref 11.6–14.4)
ERYTHROCYTE [SEDIMENTATION RATE] IN BLOOD BY WESTERGREN METHOD: 48 MM/HR
FERRITIN SERPL-MCNC: 332 NG/ML (ref 24–250)
GFR SERPL CREATININE-BSD FRML MDRD: >60 ML/MIN/1.73M*2
GLUCOSE SERPL-MCNC: 99 MG/DL (ref 70–99)
HCT VFR BLDCO AUTO: 42.4 % (ref 40.1–51)
HGB BLD-MCNC: 13.5 G/DL (ref 13.7–17.5)
IMM GRANULOCYTES # BLD AUTO: 0.02 K/UL (ref 0–0.08)
IMM GRANULOCYTES NFR BLD AUTO: 0.3 %
IRON SATN MFR SERPL: 25 % (ref 15–45)
IRON SERPL-MCNC: 73 UG/DL (ref 35–150)
LYMPHOCYTES # BLD: 2.12 K/UL (ref 1.2–3.5)
LYMPHOCYTES NFR BLD: 29.7 %
MCH RBC QN AUTO: 27.7 PG (ref 28–33.2)
MCHC RBC AUTO-ENTMCNC: 31.8 G/DL (ref 32.2–36.5)
MCV RBC AUTO: 87.1 FL (ref 83–98)
MONOCYTES # BLD: 0.62 K/UL (ref 0.3–1)
MONOCYTES NFR BLD: 8.7 %
NEUTROPHILS # BLD: 4.03 K/UL (ref 1.7–7)
NEUTROPHILS # BLD: 4.03 K/UL (ref 1.7–7)
NEUTS SEG NFR BLD: 56.6 %
NRBC BLD-RTO: 0 %
PDW BLD AUTO: 10.9 FL (ref 9.4–12.4)
PLATELET # BLD AUTO: 203 K/UL (ref 150–350)
POTASSIUM SERPL-SCNC: 4.5 MEQ/L (ref 3.6–5.1)
PROT SERPL-MCNC: 7.1 G/DL (ref 6–8.2)
RBC # BLD AUTO: 4.87 M/UL (ref 4.5–5.8)
SODIUM SERPL-SCNC: 137 MEQ/L (ref 136–144)
TIBC SERPL-MCNC: 293 UG/DL (ref 270–460)
UIBC SERPL-MCNC: 220 UG/DL (ref 180–360)
WBC # BLD AUTO: 7.13 K/UL (ref 3.8–10.5)

## 2022-05-12 PROCEDURE — 82728 ASSAY OF FERRITIN: CPT

## 2022-05-12 PROCEDURE — 84165 PROTEIN E-PHORESIS SERUM: CPT

## 2022-05-12 PROCEDURE — 86334 IMMUNOFIX E-PHORESIS SERUM: CPT

## 2022-05-12 PROCEDURE — 82784 ASSAY IGA/IGD/IGG/IGM EACH: CPT

## 2022-05-12 PROCEDURE — 80053 COMPREHEN METABOLIC PANEL: CPT

## 2022-05-12 PROCEDURE — 83550 IRON BINDING TEST: CPT

## 2022-05-12 PROCEDURE — 85025 COMPLETE CBC W/AUTO DIFF WBC: CPT

## 2022-05-12 PROCEDURE — 83521 IG LIGHT CHAINS FREE EACH: CPT

## 2022-05-12 PROCEDURE — 36415 COLL VENOUS BLD VENIPUNCTURE: CPT

## 2022-05-12 PROCEDURE — 85652 RBC SED RATE AUTOMATED: CPT

## 2022-05-13 LAB
ALBUMIN MFR UR ELPH: 63.7 %
ALBUMIN SERPL ELPH-MCNC: 4.33 G/DL (ref 3.2–4.9)
ALBUMIN/GLOB SERPL: 1.8 {RATIO} (ref 1.1–2.1)
ALPHA1 GLOB MFR SERPL ELPH: 2.4 %
ALPHA1 GLOB SERPL ELPH-MCNC: 0.16 G/DL (ref 0.08–0.23)
ALPHA2 GLOB MFR UR ELPH: 10.4 %
ALPHA2 GLOB SERPL ELPH-MCNC: 0.71 G/DL (ref 0.45–0.92)
B-GLOBULIN SERPL ELPH-MCNC: 0.68 G/DL (ref 0.5–1.03)
BETA1 GLOB MFR UR ELPH: 10 %
GAMMA GLOB MFR UR ELPH: 13.5 %
GAMMA GLOB SERPL ELPH-MCNC: 0.92 G/DL (ref 0.6–1.6)
IGA SERPL-MCNC: 305 MG/DL (ref 84.5–499)
IGG SERPL-MCNC: 1250 MG/DL (ref 537–1535)
IGM SERPL-MCNC: 27 MG/DL (ref 35–242)
KAPPA LC SERPL-MCNC: 30.91 MG/L (ref 8.96–34.28)
KAPPA LC/LAMBDA SER: 1.56 {RATIO} (ref 0.64–1.83)
LAMBDA LC SERPL-MCNC: 19.8 MG/L (ref 5.7–26.3)
M PROTEIN MFR SERPL ELPH: 4.6 %
M PROTEIN SERPL ELPH-MCNC: 0.31 G/DL
PROT PATTERN SERPL ELPH-IMP: NORMAL
PROT SERPL-MCNC: 6.8 G/DL (ref 6–8.2)

## 2022-05-16 LAB — INTERPRETATION SERPL IFE-IMP: NORMAL

## 2023-08-02 ENCOUNTER — PROCEDURE VISIT (OUTPATIENT)
Dept: OTOLARYNGOLOGY | Facility: CLINIC | Age: 73
End: 2023-08-02
Payer: MEDICARE

## 2023-08-02 ENCOUNTER — OFFICE VISIT (OUTPATIENT)
Dept: OTOLARYNGOLOGY | Facility: CLINIC | Age: 73
End: 2023-08-02
Payer: MEDICARE

## 2023-08-02 VITALS
SYSTOLIC BLOOD PRESSURE: 122 MMHG | OXYGEN SATURATION: 98 % | BODY MASS INDEX: 20.46 KG/M2 | DIASTOLIC BLOOD PRESSURE: 82 MMHG | TEMPERATURE: 98 F | WEIGHT: 135 LBS | HEART RATE: 54 BPM | HEIGHT: 68 IN

## 2023-08-02 DIAGNOSIS — H61.23 BILATERAL IMPACTED CERUMEN: Primary | ICD-10-CM

## 2023-08-02 DIAGNOSIS — H91.8X3 ASYMMETRICAL HEARING LOSS: ICD-10-CM

## 2023-08-02 DIAGNOSIS — H91.8X3 ASYMMETRICAL HEARING LOSS: Primary | ICD-10-CM

## 2023-08-02 DIAGNOSIS — H91.13 PRESBYCUSIS OF BOTH EARS: ICD-10-CM

## 2023-08-02 PROCEDURE — 99204 OFFICE O/P NEW MOD 45 MIN: CPT | Mod: 25 | Performed by: OTOLARYNGOLOGY

## 2023-08-02 PROCEDURE — 92557 COMPREHENSIVE HEARING TEST: CPT | Performed by: AUDIOLOGIST-HEARING AID FITTER

## 2023-08-02 PROCEDURE — 99999 PR OFFICE/OUTPT VISIT,PROCEDURE ONLY: CPT | Performed by: AUDIOLOGIST-HEARING AID FITTER

## 2023-08-02 PROCEDURE — 92567 TYMPANOMETRY: CPT | Performed by: AUDIOLOGIST-HEARING AID FITTER

## 2023-08-02 PROCEDURE — 69210 REMOVE IMPACTED EAR WAX UNI: CPT | Performed by: OTOLARYNGOLOGY

## 2023-08-02 NOTE — PROGRESS NOTES
[facility]  [address], [address]  [place] [address]  Phone: [phone]  Fax: [phone]      Patient ID: Otis Mccoy                                  : 1950    Visit Date: 2023    Referring Provider: Reshma Cloud*    Reason for Consultation/Chief Complaint  Chief Complaint   Patient presents with   • Hearing Problem   • Cerumen Impaction     right       History of Present Illness  Otis Mccoy is a 73 y.o. male who presents with difficulty hearing in both ears. He has a history of wax accumulation.  He is accompanied by his daughter who states he has had difficulty hearing for the last 9 months. He has no other ENT concerns.    Review of Systems  Fourteen point ROS is otherwise negative except for those detailed above in the History of Present Illness.    Current Medications  Current Outpatient Medications   Medication Sig Dispense Refill   • DULoxetine (CYMBALTA) 60 mg capsule Take 60 mg by mouth nightly.     • finasteride (PROSCAR) 5 mg tablet Take 5 mg by mouth daily.       • ibuprofen (MOTRIN) 200 mg tablet Take 4 tablets (800 mg total) by mouth every 6 (six) hours as needed for mild pain.     • lamoTRIgine (LaMICtal) 100 mg tablet Take 200 mg by mouth daily.       • QUEtiapine (SEROquel) 200 mg tablet Take 200 mg by mouth nightly.     • tamsulosin (FLOMAX) 0.4 mg capsule Take 0.4 mg by mouth daily.       • gabapentin (NEURONTIN) 100 mg capsule Take 300 mg by mouth 2 (two) times a day.       • polyethylene glycol (MIRALAX) 17 gram packet Take 17 g by mouth daily for 3 days. (Patient not taking: Reported on 2023) 3 packet 0   • senna (SENOKOT) 8.6 mg tablet Take 1 tablet by mouth daily. Purchase OTC Miralax/Senokot.   Take only if necessary for constipation (Patient not taking: Reported on 2023) 30 tablet 0     No current facility-administered medications for this visit.       Past Medical History  Past Medical History:   Diagnosis Date   • Anxiety   "   hx of   • Arthritis     bottom of spine   • Celiac disease     hx of   • Depression    • ED (erectile dysfunction)     hx of   • Fatigue     hx of   • Numbness     hx of       I reviewed the Past Medical History and I did not make any changes or additions.    Past Surgical History  Past Surgical History:   Procedure Laterality Date   • BACK SURGERY     • CARDIAC SURGERY     • CATARACT EXTRACTION, BILATERAL      hx of   • KNEE ARTHROPLASTY      left knee       I reviewed the Past Surgical History and I did not make any changes or additions.    Social History  Social History     Tobacco Use   • Smoking status: Every Day     Packs/day: 1.00     Years: 50.00     Pack years: 50.00     Types: Cigarettes   • Smokeless tobacco: Never   Vaping Use   • Vaping Use: Never used   Substance Use Topics   • Alcohol use: Yes     Alcohol/week: 1.0 standard drink of alcohol     Types: 1 Glasses of wine per week     Comment: 6driinks a week   • Drug use: Never       I reviewed the Social History and I did not make any changes or additions.    Family History  Family History   Problem Relation Age of Onset   • Brain Aneurysm Biological Mother    • No Known Problems Biological Father    • No Known Problems Biological Sister        I reviewed the Family History and I did not make any changes or additions.    Allergies  Gluten    I reviewed the Allergies and I did not make any changes or additions.    Physical Exam  Vitals:   Visit Vitals  /82 (BP Location: Left upper arm, Patient Position: Sitting)   Pulse (!) 54   Temp 36.7 °C (98 °F) (Temporal)   Ht 1.727 m (5' 8\")   Wt 61.2 kg (135 lb)   SpO2 98%   BMI 20.53 kg/m²     General:  Well-developed, well-nourished 73 y.o. male in no acute distress  Voice: Normal without hoarseness, breathiness or stridor.  Head: normocephalic, atraumatic  Face: No scars or lesions. Normal symmetry  Eyes:  Extraocular muscles appear grossly intact without signs of conjunctival hemorrhage; sclerae are " white  Ears: External ear is normal bilaterally.  External auditory normal without stenosis or exostosis. Bilateral cerumen. Tympanic membranes clear, mobile and without retraction or scar.  No fluid in the middle ear space. No signs of middle ear infection.  Nose: No obvious external nasal deformity is appreciated  Oral Cavity/oropharynx:  Normal tongue movement. Normal gag reflex. No mucosal lesions or masses, leukoplakia, erythroplakia, ulcers or other abnormalities of the tongue, floor of mouth, buccal mucosa, palate or posterior pharyngeal wall are noted.  Neck:  No masses, adenopathy, cervical spasm or thyroid enlargement  Cranial Nerves II through XII: Grossly intact  Lungs: Non-labored breathing    Pertinent radiology and labs reviewed    Audiogram: right moderate to severe SNHL with 60% speech discrimination , left moderate SNHL with 88% speech discrimination    Tympanogram: normal bilaterally    Assessment and Plan  Otis Mccoy is a 73 y.o. male with cerumen impaction bilaterally which I removed with a curette.  He tolerated this well and both ears are normal on exam.  I recommended hearing aid evaluation based on his audiogram results and 1-2 year follow up. I also recommended MRI IAC for his asymmetric hearing loss.  We will call him with results.      Thank you for the referral and the opportunity to participate in this patient's care.    Sincerely,       Peyman Garcia DO, MS, MBS, FACS  Otolaryngologist - Head & Neck Surgeon

## 2023-11-02 ENCOUNTER — HOSPITAL ENCOUNTER (OUTPATIENT)
Dept: RADIOLOGY | Age: 73
Discharge: HOME | End: 2023-11-02
Attending: OTOLARYNGOLOGY
Payer: MEDICARE

## 2023-11-02 DIAGNOSIS — H91.8X3 ASYMMETRICAL HEARING LOSS: ICD-10-CM

## 2023-11-02 RX ORDER — GADOBUTROL 604.72 MG/ML
0.1 INJECTION INTRAVENOUS ONCE
Status: COMPLETED | OUTPATIENT
Start: 2023-11-02 | End: 2023-11-02

## 2023-11-02 RX ADMIN — GADOBUTROL 6.4 ML: 604.72 INJECTION INTRAVENOUS at 11:26

## 2024-10-31 ENCOUNTER — TRANSCRIBE ORDERS (OUTPATIENT)
Dept: LAB | Facility: HOSPITAL | Age: 74
End: 2024-10-31

## 2024-10-31 ENCOUNTER — APPOINTMENT (OUTPATIENT)
Dept: LAB | Facility: HOSPITAL | Age: 74
End: 2024-10-31
Attending: INTERNAL MEDICINE
Payer: MEDICARE

## 2024-10-31 DIAGNOSIS — D47.2 MONOCLONAL GAMMOPATHY: ICD-10-CM

## 2024-10-31 DIAGNOSIS — D51.8 OTHER VITAMIN B12 DEFICIENCY ANEMIAS: ICD-10-CM

## 2024-10-31 DIAGNOSIS — Z12.11 ENCOUNTER FOR SCREENING FOR MALIGNANT NEOPLASM OF COLON: Primary | ICD-10-CM

## 2024-10-31 DIAGNOSIS — D64.9 ANEMIA, UNSPECIFIED: ICD-10-CM

## 2024-10-31 DIAGNOSIS — D50.9 IRON DEFICIENCY ANEMIA, UNSPECIFIED: ICD-10-CM

## 2024-10-31 DIAGNOSIS — D52.9 FOLATE DEFICIENCY ANEMIA, UNSPECIFIED: ICD-10-CM

## 2024-10-31 DIAGNOSIS — Z12.11 ENCOUNTER FOR SCREENING FOR MALIGNANT NEOPLASM OF COLON: ICD-10-CM

## 2024-10-31 LAB
ALBUMIN SERPL-MCNC: 3.9 G/DL (ref 3.5–5.7)
ALP SERPL-CCNC: 75 IU/L (ref 34–125)
ALT SERPL-CCNC: 14 IU/L (ref 7–52)
ANION GAP SERPL CALC-SCNC: 4 MEQ/L (ref 3–15)
AST SERPL-CCNC: 15 IU/L (ref 13–39)
BASOPHILS # BLD: 0.05 K/UL (ref 0.01–0.1)
BASOPHILS NFR BLD: 0.7 %
BILIRUB SERPL-MCNC: 0.4 MG/DL (ref 0.3–1.2)
BUN SERPL-MCNC: 15 MG/DL (ref 7–25)
CALCIUM SERPL-MCNC: 8.9 MG/DL (ref 8.6–10.3)
CHLORIDE SERPL-SCNC: 110 MEQ/L (ref 98–107)
CO2 SERPL-SCNC: 28 MEQ/L (ref 21–31)
CREAT SERPL-MCNC: 1.1 MG/DL (ref 0.7–1.3)
DIFFERENTIAL METHOD BLD: ABNORMAL
EGFRCR SERPLBLD CKD-EPI 2021: >60 ML/MIN/1.73M*2
EOSINOPHIL # BLD: 0.14 K/UL (ref 0.04–0.54)
EOSINOPHIL NFR BLD: 1.9 %
ERYTHROCYTE [DISTWIDTH] IN BLOOD BY AUTOMATED COUNT: 14.9 % (ref 11.6–14.4)
FERRITIN SERPL-MCNC: 126 NG/ML (ref 24–250)
FOLATE SERPL-MCNC: 16.5 NG/ML
GLUCOSE SERPL-MCNC: 82 MG/DL (ref 70–99)
HCT VFR BLD AUTO: 36.7 % (ref 40.1–51)
HGB BLD-MCNC: 11.7 G/DL (ref 13.7–17.5)
IMM GRANULOCYTES # BLD AUTO: 0.02 K/UL (ref 0–0.08)
IMM GRANULOCYTES NFR BLD AUTO: 0.3 %
IRON SATN MFR SERPL: 17 % (ref 15–45)
IRON SERPL-MCNC: 47 UG/DL (ref 35–150)
LYMPHOCYTES # BLD: 1.57 K/UL (ref 1.2–3.5)
LYMPHOCYTES NFR BLD: 21.1 %
MCH RBC QN AUTO: 27.1 PG (ref 28–33.2)
MCHC RBC AUTO-ENTMCNC: 31.9 G/DL (ref 32.2–36.5)
MCV RBC AUTO: 85 FL (ref 83–98)
MONOCYTES # BLD: 0.41 K/UL (ref 0.3–1)
MONOCYTES NFR BLD: 5.5 %
NEUTROPHILS # BLD: 5.24 K/UL (ref 1.7–7)
NEUTROPHILS # BLD: 5.24 K/UL (ref 1.7–7)
NEUTS SEG NFR BLD: 70.5 %
NRBC BLD-RTO: 0 %
PLATELET # BLD AUTO: 199 K/UL (ref 150–350)
PMV BLD AUTO: 11.3 FL (ref 9.4–12.4)
POTASSIUM SERPL-SCNC: 4.4 MEQ/L (ref 3.5–5.1)
PROT SERPL-MCNC: 6.7 G/DL (ref 6–8.2)
RBC # BLD AUTO: 4.32 M/UL (ref 4.5–5.8)
SODIUM SERPL-SCNC: 142 MEQ/L (ref 136–145)
TIBC SERPL-MCNC: 273 UG/DL (ref 270–460)
UIBC SERPL-MCNC: 226 UG/DL (ref 180–360)
VIT B12 SERPL-MCNC: >1500 PG/ML (ref 180–914)
WBC # BLD AUTO: 7.43 K/UL (ref 3.8–10.5)

## 2024-10-31 PROCEDURE — 86334 IMMUNOFIX E-PHORESIS SERUM: CPT

## 2024-10-31 PROCEDURE — 82728 ASSAY OF FERRITIN: CPT

## 2024-10-31 PROCEDURE — 83521 IG LIGHT CHAINS FREE EACH: CPT

## 2024-10-31 PROCEDURE — 85025 COMPLETE CBC W/AUTO DIFF WBC: CPT

## 2024-10-31 PROCEDURE — 82746 ASSAY OF FOLIC ACID SERUM: CPT

## 2024-10-31 PROCEDURE — 80053 COMPREHEN METABOLIC PANEL: CPT

## 2024-10-31 PROCEDURE — 83540 ASSAY OF IRON: CPT

## 2024-10-31 PROCEDURE — 84165 PROTEIN E-PHORESIS SERUM: CPT

## 2024-10-31 PROCEDURE — 83550 IRON BINDING TEST: CPT

## 2024-10-31 PROCEDURE — 82607 VITAMIN B-12: CPT

## 2024-10-31 PROCEDURE — 82784 ASSAY IGA/IGD/IGG/IGM EACH: CPT | Mod: 59

## 2024-10-31 PROCEDURE — 36415 COLL VENOUS BLD VENIPUNCTURE: CPT

## 2024-11-01 LAB
ALBUMIN MFR UR ELPH: 63.7 %
ALBUMIN SERPL ELPH-MCNC: 4.27 G/DL (ref 3.2–4.9)
ALBUMIN/GLOB SERPL: 1.8 {RATIO} (ref 1.1–2.1)
ALPHA1 GLOB MFR SERPL ELPH: 2.8 %
ALPHA1 GLOB SERPL ELPH-MCNC: 0.19 G/DL (ref 0.08–0.23)
ALPHA2 GLOB MFR UR ELPH: 10 %
ALPHA2 GLOB SERPL ELPH-MCNC: 0.67 G/DL (ref 0.45–0.92)
B-GLOBULIN SERPL ELPH-MCNC: 0.62 G/DL (ref 0.5–1.03)
BETA1 GLOB MFR UR ELPH: 9.3 %
GAMMA GLOB MFR UR ELPH: 14.2 %
GAMMA GLOB SERPL ELPH-MCNC: 0.95 G/DL (ref 0.6–1.6)
IGA SERPL-MCNC: 248 MG/DL (ref 84.5–499)
IGG SERPL-MCNC: 1250 MG/DL (ref 537–1535)
IGM SERPL-MCNC: 24 MG/DL (ref 35–242)
KAPPA LC SERPL-MCNC: 27.94 MG/L (ref 8.96–34.28)
KAPPA LC/LAMBDA SER: 1.62 {RATIO} (ref 0.64–1.83)
LAMBDA LC SERPL-MCNC: 17.2 MG/L (ref 5.7–26.3)
M PROTEIN MFR SERPL ELPH: 6.3 %
M PROTEIN SERPL ELPH-MCNC: 0.42 G/DL
PROT PATTERN SERPL ELPH-IMP: NORMAL
PROT SERPL-MCNC: 6.7 G/DL (ref 6–8.2)

## 2024-11-05 LAB — INTERPRETATION SERPL IFE-IMP: NORMAL

## (undated) DEVICE — MEPILEX BORDER 3 X 3

## (undated) DEVICE — Device

## (undated) DEVICE — DRAIN ROUND 15FR 3/16IN

## (undated) DEVICE — SUTURE 1 27IN VICRYL PLUS OS-6

## (undated) DEVICE — SYRINGE DISP LUER-LOK 30 CC

## (undated) DEVICE — GOWN SURGICAL REINFORCED X-LAR

## (undated) DEVICE — HANDPIECE SUCTION INTERPULSE W/BONE CLEANING TIP

## (undated) DEVICE — BAG DECANTER

## (undated) DEVICE — SOLN IV 0.9% NSS 1000ML

## (undated) DEVICE — PACK RFID BASIC NEURO CUSTOM

## (undated) DEVICE — DRAPE HALF STERILE

## (undated) DEVICE — RESERVOIR DRAIN 100ML

## (undated) DEVICE — DEVICE NPWT PICO 10 X 20CM

## (undated) DEVICE — PACK UNIVERSAL

## (undated) DEVICE — MANIFOLD SINGLE PORT NEPTUNE

## (undated) DEVICE — GAUZE 8X4 16 PLY RFID DOUBLE XRAY

## (undated) DEVICE — GLOVE SURG PROTEXIS PF 8.5

## (undated) DEVICE — EVACUATOR CLOSED SUCTION 100C

## (undated) DEVICE — SPONGE SURGIFOAM ABSORBABLE GELATIN 100 8.5CM X 12CM X 10MM

## (undated) DEVICE — SPONGE DRESSING DRAIN STERILE EXCILON 2S

## (undated) DEVICE — PAD GROUND ELECTROSURGICAL W/CORD

## (undated) DEVICE — CATH IV 14G X 2IN ACUVANCE PLUS

## (undated) DEVICE — SOLN IRRIG .9%SOD 1000ML

## (undated) DEVICE — TAPE MICROFOAM 4IN

## (undated) DEVICE — SUTURE VICRYL PLUS 2-0 VCP869H

## (undated) DEVICE — APPLICATOR CHLORAPREP 26ML ORANGE TINT

## (undated) DEVICE — GOWN SURGICAL REINFORCED XX-LARGE

## (undated) DEVICE — STAPLER SKIN

## (undated) DEVICE — NEEDLE DISP HYPO 18GX1-1/2IN

## (undated) DEVICE — TIP SUCTION YANKAUER

## (undated) DEVICE — SOLN IV NSS 0.9% 250ML

## (undated) DEVICE — APPLICATOR CAVILON FILM LARGE